# Patient Record
Sex: MALE | Race: WHITE | Employment: FULL TIME | URBAN - METROPOLITAN AREA
[De-identification: names, ages, dates, MRNs, and addresses within clinical notes are randomized per-mention and may not be internally consistent; named-entity substitution may affect disease eponyms.]

---

## 2017-05-25 NOTE — TELEPHONE ENCOUNTER
Pending Prescriptions Disp Refills    OMEPRAZOLE 40 MG Oral Capsule Delayed Release [Pharmacy Med Name: OMEPRAZOLE 40MG CAPSULES] 30 capsule 0     Sig: TAKE 1 CAPSULE BY MOUTH DAILY         Last seen 4/16/15 colonoscopy /EGD  LR 5/09/16    em

## 2017-05-26 RX ORDER — OMEPRAZOLE 40 MG/1
CAPSULE, DELAYED RELEASE ORAL
Qty: 30 CAPSULE | Refills: 3 | Status: SHIPPED | OUTPATIENT
Start: 2017-05-26 | End: 2017-09-27

## 2017-05-26 NOTE — TELEPHONE ENCOUNTER
RN to have the pt see me in the office, he has Barretts and needs monitoring.  Please make sure on recall for EGD

## 2017-05-30 NOTE — TELEPHONE ENCOUNTER
LVM for pt to call back. 1) Holding EGD (barretts) slot with PL for Thursday 6.15.17 at 9:30am. Pt unaware. Pt was due 4.20.17 for EGD (2 year recall). 2) Pt to be seen in office prior.  Pls try Friday 6.2 or 6.9    Pt informed that med refilled and to c

## 2017-06-02 NOTE — TELEPHONE ENCOUNTER
Pt accepted OV appt for Friday 6.9.17 at 1:45pm.  Per below, needed follow up for Barretts. HOLDING EGD slot for Friday 6.16.17 at 10:45am (arrival 9:45). This appt is to be confirmed after his OV with PL the week before.      Awaiting call back to add to

## 2017-06-09 ENCOUNTER — TELEPHONE (OUTPATIENT)
Dept: GASTROENTEROLOGY | Facility: CLINIC | Age: 47
End: 2017-06-09

## 2017-06-09 ENCOUNTER — OFFICE VISIT (OUTPATIENT)
Dept: GASTROENTEROLOGY | Facility: CLINIC | Age: 47
End: 2017-06-09

## 2017-06-09 VITALS
HEIGHT: 70 IN | DIASTOLIC BLOOD PRESSURE: 82 MMHG | HEART RATE: 69 BPM | BODY MASS INDEX: 40.8 KG/M2 | SYSTOLIC BLOOD PRESSURE: 132 MMHG | WEIGHT: 285 LBS

## 2017-06-09 DIAGNOSIS — K22.719 BARRETT'S ESOPHAGUS WITH DYSPLASIA: ICD-10-CM

## 2017-06-09 DIAGNOSIS — K21.9 GASTROESOPHAGEAL REFLUX DISEASE, ESOPHAGITIS PRESENCE NOT SPECIFIED: Primary | ICD-10-CM

## 2017-06-09 PROCEDURE — 99212 OFFICE O/P EST SF 10 MIN: CPT | Performed by: INTERNAL MEDICINE

## 2017-06-09 PROCEDURE — 99213 OFFICE O/P EST LOW 20 MIN: CPT | Performed by: INTERNAL MEDICINE

## 2017-06-09 NOTE — TELEPHONE ENCOUNTER
Scheduled for:  EGD 73923  Provider Name:  Dr. Claudia Francois  Date:  6/16/17  Location:  [facility]  Sedation:  MAC  Time:  1625 (pt is aware to arrive at 0945)   Prep:  NPO after midnight  Meds/Allergies Reconciled?:  Physician reviewed   Diagnosis with codes:  History

## 2017-06-09 NOTE — PATIENT INSTRUCTIONS
GERD  - continue on omeprazole daily  - ok to add in pepcid as needed  - weight loss - call [phone] for our weight program  - EGD for Barretts history

## 2017-06-09 NOTE — PROGRESS NOTES
Faustino Nicolas is a 55year old male. HPI:   Patient presents with: Follow - Up      The patient is a 78-year-old male who has a history of obesity and, chronic reflux disease.   The patient has gained about 35 pounds since he was last seen 2 years ag lesions  Neuro- appropriate response, alert, no confusion  . ASSESSMENT/PLAN:   Assessment  Crain's esophagus  GERD    The patient is a 63-year-old male who has a history of chronic reflux. I discussed the options with the patient.   He has history of B

## 2017-06-16 ENCOUNTER — SURGERY (OUTPATIENT)
Age: 47
End: 2017-06-16

## 2017-06-16 ENCOUNTER — ANESTHESIA EVENT (OUTPATIENT)
Dept: ENDOSCOPY | Facility: HOSPITAL | Age: 47
End: 2017-06-16
Payer: COMMERCIAL

## 2017-06-16 ENCOUNTER — ANESTHESIA (OUTPATIENT)
Dept: ENDOSCOPY | Facility: HOSPITAL | Age: 47
End: 2017-06-16
Payer: COMMERCIAL

## 2017-06-16 ENCOUNTER — HOSPITAL ENCOUNTER (OUTPATIENT)
Facility: HOSPITAL | Age: 47
Setting detail: HOSPITAL OUTPATIENT SURGERY
Discharge: HOME | End: 2017-06-16
Attending: INTERNAL MEDICINE | Admitting: INTERNAL MEDICINE
Payer: COMMERCIAL

## 2017-06-16 VITALS
BODY MASS INDEX: 38.65 KG/M2 | HEIGHT: 70 IN | WEIGHT: 270 LBS | OXYGEN SATURATION: 96 % | DIASTOLIC BLOOD PRESSURE: 56 MMHG | SYSTOLIC BLOOD PRESSURE: 116 MMHG | HEART RATE: 57 BPM | RESPIRATION RATE: 20 BRPM

## 2017-06-16 DIAGNOSIS — K22.70 BARRETT ESOPHAGUS: Primary | ICD-10-CM

## 2017-06-16 PROCEDURE — 43239 EGD BIOPSY SINGLE/MULTIPLE: CPT | Performed by: INTERNAL MEDICINE

## 2017-06-16 PROCEDURE — 0DB38ZX EXCISION OF LOWER ESOPHAGUS, VIA NATURAL OR ARTIFICIAL OPENING ENDOSCOPIC, DIAGNOSTIC: ICD-10-PCS | Performed by: INTERNAL MEDICINE

## 2017-06-16 RX ORDER — SODIUM CHLORIDE, SODIUM LACTATE, POTASSIUM CHLORIDE, CALCIUM CHLORIDE 600; 310; 30; 20 MG/100ML; MG/100ML; MG/100ML; MG/100ML
INJECTION, SOLUTION INTRAVENOUS CONTINUOUS PRN
Status: DISCONTINUED | OUTPATIENT
Start: 2017-06-16 | End: 2017-06-16 | Stop reason: SURG

## 2017-06-16 RX ORDER — SODIUM CHLORIDE, SODIUM LACTATE, POTASSIUM CHLORIDE, CALCIUM CHLORIDE 600; 310; 30; 20 MG/100ML; MG/100ML; MG/100ML; MG/100ML
INJECTION, SOLUTION INTRAVENOUS CONTINUOUS
Status: DISCONTINUED | OUTPATIENT
Start: 2017-06-16 | End: 2017-06-16

## 2017-06-16 RX ORDER — NALOXONE HYDROCHLORIDE 0.4 MG/ML
80 INJECTION, SOLUTION INTRAMUSCULAR; INTRAVENOUS; SUBCUTANEOUS AS NEEDED
Status: DISCONTINUED | OUTPATIENT
Start: 2017-06-16 | End: 2017-06-16

## 2017-06-16 RX ORDER — LIDOCAINE HYDROCHLORIDE 10 MG/ML
INJECTION, SOLUTION EPIDURAL; INFILTRATION; INTRACAUDAL; PERINEURAL AS NEEDED
Status: DISCONTINUED | OUTPATIENT
Start: 2017-06-16 | End: 2017-06-16 | Stop reason: SURG

## 2017-06-16 RX ADMIN — SODIUM CHLORIDE, SODIUM LACTATE, POTASSIUM CHLORIDE, CALCIUM CHLORIDE: 600; 310; 30; 20 INJECTION, SOLUTION INTRAVENOUS at 11:20:00

## 2017-06-16 RX ADMIN — LIDOCAINE HYDROCHLORIDE 50 MG: 10 INJECTION, SOLUTION EPIDURAL; INFILTRATION; INTRACAUDAL; PERINEURAL at 11:11:00

## 2017-06-16 RX ADMIN — SODIUM CHLORIDE, SODIUM LACTATE, POTASSIUM CHLORIDE, CALCIUM CHLORIDE: 600; 310; 30; 20 INJECTION, SOLUTION INTRAVENOUS at 11:07:00

## 2017-06-16 NOTE — ANESTHESIA PREPROCEDURE EVALUATION
Anesthesia PreOp Note    HPI:     Rodolfo Perdomo is a 55year old male who presents for preoperative consultation requested by: Drake Siemens, MD    Date of Surgery: 6/16/2017    Procedure(s):  ESOPHAGOGASTRODUODENOSCOPY (EGD)  Indication: History of 129/70 and his pulse is 55.  His respiration is 18 and oxygen saturation is 96%.    06/16/17  1008   BP: 129/70   Pulse: 55   Resp: 18   Height: 5' 10\"   Weight: 270 lb   SpO2: 96%        Anesthesia ROS/Med Hx and Physical Exam     Nursing notes reviewed

## 2017-06-16 NOTE — H&P
History & Physical Examination    Patient Name: Kyara Hernández  MRN: [id number]  [facility]: [id number]  YOB: 1970    Diagnosis: Crain's esophagus        Prescriptions prior to admission:  OMEPRAZOLE 40 MG Oral Capsule Delayed Release TAKE 1 CA

## 2017-06-16 NOTE — ANESTHESIA POSTPROCEDURE EVALUATION
Patient: Judge Poe    Procedure Summary     Date Anesthesia Start Anesthesia Stop Room / Location    06/16/17 1110  [facility] ENDOSCOPY 05 / [facility] ENDOSCOPY       Procedure Diagnosis Surgeon Responsible Provider    ESOPHAGOGASTRODUODENOSCOPY (EGD) (N/A ) Hi

## 2017-06-16 NOTE — OPERATIVE REPORT
[facility] HOSP - [facility] Endoscopy Report      Preoperative Diagnosis:  - History of Crain's esophagus      Postoperative Diagnosis:  - Biopsies taken distal esophagus      Procedure:      Esophagogastroduodenoscopy       Surgeon:  Denisse Martinez M.D.

## 2017-06-28 ENCOUNTER — TELEPHONE (OUTPATIENT)
Dept: GASTROENTEROLOGY | Facility: CLINIC | Age: 47
End: 2017-06-28

## 2017-06-28 NOTE — TELEPHONE ENCOUNTER
----- Message from Frank Mccarthy MD sent at 6/23/2017  6:50 PM CDT -----  No evidence of intestinal metaplasia.       Place on call back for 3 years for EGD

## 2017-06-28 NOTE — TELEPHONE ENCOUNTER
Place on call back for 3 years for EGD   Recall EGD in 3 years per .  EGD done 6/16/17  Entered into 3 yr EGD recall    em

## 2017-09-28 NOTE — TELEPHONE ENCOUNTER
See refill request  Patient last seen 6-9-17.    Medication last refilled 5-26-17    Pending Prescriptions Disp Refills    OMEPRAZOLE 40 MG Oral Capsule Delayed Release [Pharmacy Med Name: OMEPRAZOLE 40MG CAPSULES] 30 capsule 0     Sig: TAKE 1 CAPSULE BY MO

## 2017-09-29 ENCOUNTER — OFFICE VISIT (OUTPATIENT)
Dept: INTERNAL MEDICINE CLINIC | Facility: CLINIC | Age: 47
End: 2017-09-29

## 2017-09-29 ENCOUNTER — LAB ENCOUNTER (OUTPATIENT)
Dept: LAB | Facility: HOSPITAL | Age: 47
End: 2017-09-29
Attending: INTERNAL MEDICINE
Payer: COMMERCIAL

## 2017-09-29 VITALS
WEIGHT: 269 LBS | DIASTOLIC BLOOD PRESSURE: 84 MMHG | HEART RATE: 57 BPM | HEIGHT: 70 IN | BODY MASS INDEX: 38.51 KG/M2 | SYSTOLIC BLOOD PRESSURE: 128 MMHG

## 2017-09-29 DIAGNOSIS — R00.2 PALPITATIONS: ICD-10-CM

## 2017-09-29 DIAGNOSIS — R00.2 PALPITATIONS: Primary | ICD-10-CM

## 2017-09-29 DIAGNOSIS — Z00.00 ANNUAL PHYSICAL EXAM: Primary | ICD-10-CM

## 2017-09-29 DIAGNOSIS — Z00.00 ANNUAL PHYSICAL EXAM: ICD-10-CM

## 2017-09-29 PROBLEM — E78.00 HYPERCHOLESTEROLEMIA: Status: ACTIVE | Noted: 2017-09-29

## 2017-09-29 PROCEDURE — 93010 ELECTROCARDIOGRAM REPORT: CPT | Performed by: INTERNAL MEDICINE

## 2017-09-29 PROCEDURE — 99396 PREV VISIT EST AGE 40-64: CPT | Performed by: INTERNAL MEDICINE

## 2017-09-29 PROCEDURE — 85027 COMPLETE CBC AUTOMATED: CPT

## 2017-09-29 PROCEDURE — 36415 COLL VENOUS BLD VENIPUNCTURE: CPT

## 2017-09-29 PROCEDURE — 80053 COMPREHEN METABOLIC PANEL: CPT

## 2017-09-29 PROCEDURE — 80061 LIPID PANEL: CPT

## 2017-09-29 PROCEDURE — 93005 ELECTROCARDIOGRAM TRACING: CPT

## 2017-09-29 NOTE — H&P
Reji Carpenter is a 55year old male who presents for a complete physical exam.   HPI:   Previous PCP was Dr. Moiz Hickman, now retired. He has a number of symptoms to discuss as delineated below under review of systems. He works as a .    and Grandfather       MI age 47   • Heart Disease Paternal Grandmother       MI age 61   • Brain Cancer [OTHER] Paternal Grandfather       Social History:  Smoking status: Former Smoker                                                              Packs complete physical exam.     1. Annual physical exam  Check CMP CBC and lipid profile today. Order sent.   Discussed and recommended healthy lifestyle–ongoing abstinence from tobacco, alcohol in moderation not to exceed 2 beers daily, healthy diet, regular

## 2017-09-29 NOTE — PATIENT INSTRUCTIONS
Await results of blood tests, EKG and Holter monitor. Please try to eat healthy, exercise regularly and lose weight. Abstain from all tobacco use. Please limit alcohol to no more than 2 beers daily.   Follow-up visit in about 2 weeks to reassess your sym

## 2017-10-02 RX ORDER — OMEPRAZOLE 40 MG/1
CAPSULE, DELAYED RELEASE ORAL
Qty: 90 CAPSULE | Refills: 3 | Status: SHIPPED | OUTPATIENT
Start: 2017-10-02 | End: 2018-09-28

## 2017-10-02 NOTE — TELEPHONE ENCOUNTER
Per Dr. Abbie Pierre last office note in June 2017 patient to continue on omeprazole daily due to history of Crain's and risks of progression to esophageal cancer. Patient is up-to-date on EGD with a 3 year recall. Rx refilled for 1 year.

## 2018-02-04 ENCOUNTER — HOSPITAL ENCOUNTER (OUTPATIENT)
Facility: HOSPITAL | Age: 48
Setting detail: OBSERVATION
Discharge: HOME | End: 2018-02-05
Attending: EMERGENCY MEDICINE | Admitting: HOSPITALIST
Payer: COMMERCIAL

## 2018-02-04 ENCOUNTER — APPOINTMENT (OUTPATIENT)
Dept: GENERAL RADIOLOGY | Facility: HOSPITAL | Age: 48
End: 2018-02-04
Payer: COMMERCIAL

## 2018-02-04 ENCOUNTER — HOSPITAL ENCOUNTER (OUTPATIENT)
Age: 48
Discharge: ANOTHER HOSPITAL | End: 2018-02-04
Attending: FAMILY MEDICINE
Payer: COMMERCIAL

## 2018-02-04 VITALS
SYSTOLIC BLOOD PRESSURE: 158 MMHG | WEIGHT: 265 LBS | RESPIRATION RATE: 20 BRPM | DIASTOLIC BLOOD PRESSURE: 100 MMHG | OXYGEN SATURATION: 97 % | BODY MASS INDEX: 37.94 KG/M2 | HEIGHT: 70 IN | TEMPERATURE: 97 F | HEART RATE: 68 BPM

## 2018-02-04 DIAGNOSIS — R07.9 CHEST PAIN RADIATING TO JAW: Primary | ICD-10-CM

## 2018-02-04 DIAGNOSIS — I20.0 UNSTABLE ANGINA (HCC): Primary | ICD-10-CM

## 2018-02-04 LAB
ANION GAP SERPL CALC-SCNC: 10 MMOL/L (ref 0–18)
BASOPHILS # BLD: 0.1 K/UL (ref 0–0.2)
BASOPHILS NFR BLD: 1 %
BUN SERPL-MCNC: 14 MG/DL (ref 8–20)
BUN/CREAT SERPL: 12.4 (ref 10–20)
CALCIUM SERPL-MCNC: 9 MG/DL (ref 8.5–10.5)
CHLORIDE SERPL-SCNC: 103 MMOL/L (ref 95–110)
CO2 SERPL-SCNC: 23 MMOL/L (ref 22–32)
CREAT SERPL-MCNC: 1.13 MG/DL (ref 0.5–1.5)
D DIMER PPP FEU-MCNC: 0.44 MCG/ML (ref ?–0.5)
EOSINOPHIL # BLD: 0.3 K/UL (ref 0–0.7)
EOSINOPHIL NFR BLD: 3 %
ERYTHROCYTE [DISTWIDTH] IN BLOOD BY AUTOMATED COUNT: 13.2 % (ref 11–15)
GLUCOSE SERPL-MCNC: 120 MG/DL (ref 70–99)
HCT VFR BLD AUTO: 42.9 % (ref 41–52)
HGB BLD-MCNC: 14.5 G/DL (ref 13.5–17.5)
LYMPHOCYTES # BLD: 1.5 K/UL (ref 1–4)
LYMPHOCYTES NFR BLD: 19 %
MCH RBC QN AUTO: 28.2 PG (ref 27–32)
MCHC RBC AUTO-ENTMCNC: 33.7 G/DL (ref 32–37)
MCV RBC AUTO: 83.7 FL (ref 80–100)
MONOCYTES # BLD: 0.5 K/UL (ref 0–1)
MONOCYTES NFR BLD: 6 %
NEUTROPHILS # BLD AUTO: 5.7 K/UL (ref 1.8–7.7)
NEUTROPHILS NFR BLD: 71 %
OSMOLALITY UR CALC.SUM OF ELEC: 284 MOSM/KG (ref 275–295)
PLATELET # BLD AUTO: 170 K/UL (ref 140–400)
PMV BLD AUTO: 8.2 FL (ref 7.4–10.3)
POTASSIUM SERPL-SCNC: 4 MMOL/L (ref 3.3–5.1)
RBC # BLD AUTO: 5.13 M/UL (ref 4.5–5.9)
SODIUM SERPL-SCNC: 136 MMOL/L (ref 136–144)
TROPONIN I SERPL-MCNC: 0 NG/ML (ref ?–0.03)
WBC # BLD AUTO: 8 K/UL (ref 4–11)

## 2018-02-04 PROCEDURE — 99220 INITIAL OBSERVATION CARE,LEVL III: CPT | Performed by: HOSPITALIST

## 2018-02-04 PROCEDURE — 93005 ELECTROCARDIOGRAM TRACING: CPT

## 2018-02-04 PROCEDURE — 99214 OFFICE O/P EST MOD 30 MIN: CPT

## 2018-02-04 PROCEDURE — 93010 ELECTROCARDIOGRAM REPORT: CPT | Performed by: FAMILY MEDICINE

## 2018-02-04 PROCEDURE — 93010 ELECTROCARDIOGRAM REPORT: CPT

## 2018-02-04 PROCEDURE — 71046 X-RAY EXAM CHEST 2 VIEWS: CPT | Performed by: EMERGENCY MEDICINE

## 2018-02-04 RX ORDER — ACETAMINOPHEN 325 MG/1
650 TABLET ORAL EVERY 6 HOURS PRN
Status: DISCONTINUED | OUTPATIENT
Start: 2018-02-04 | End: 2018-02-05

## 2018-02-04 RX ORDER — POLYETHYLENE GLYCOL 3350 17 G/17G
17 POWDER, FOR SOLUTION ORAL DAILY PRN
Status: DISCONTINUED | OUTPATIENT
Start: 2018-02-04 | End: 2018-02-05

## 2018-02-04 RX ORDER — MORPHINE SULFATE 2 MG/ML
1 INJECTION, SOLUTION INTRAMUSCULAR; INTRAVENOUS EVERY 2 HOUR PRN
Status: DISCONTINUED | OUTPATIENT
Start: 2018-02-04 | End: 2018-02-05

## 2018-02-04 RX ORDER — PANTOPRAZOLE SODIUM 40 MG/1
40 TABLET, DELAYED RELEASE ORAL
Status: DISCONTINUED | OUTPATIENT
Start: 2018-02-05 | End: 2018-02-05

## 2018-02-04 RX ORDER — MORPHINE SULFATE 2 MG/ML
2 INJECTION, SOLUTION INTRAMUSCULAR; INTRAVENOUS EVERY 2 HOUR PRN
Status: DISCONTINUED | OUTPATIENT
Start: 2018-02-04 | End: 2018-02-05

## 2018-02-04 RX ORDER — DOCUSATE SODIUM 100 MG/1
100 CAPSULE, LIQUID FILLED ORAL 2 TIMES DAILY
Status: DISCONTINUED | OUTPATIENT
Start: 2018-02-04 | End: 2018-02-05

## 2018-02-04 RX ORDER — ONDANSETRON 2 MG/ML
4 INJECTION INTRAMUSCULAR; INTRAVENOUS EVERY 6 HOURS PRN
Status: DISCONTINUED | OUTPATIENT
Start: 2018-02-04 | End: 2018-02-05

## 2018-02-04 RX ORDER — BISACODYL 10 MG
10 SUPPOSITORY, RECTAL RECTAL
Status: DISCONTINUED | OUTPATIENT
Start: 2018-02-04 | End: 2018-02-05

## 2018-02-04 RX ORDER — 0.9 % SODIUM CHLORIDE 0.9 %
1000 INTRAVENOUS SOLUTION INTRAVENOUS ONCE
Status: COMPLETED | OUTPATIENT
Start: 2018-02-04 | End: 2018-02-04

## 2018-02-04 RX ORDER — SODIUM PHOSPHATE, DIBASIC AND SODIUM PHOSPHATE, MONOBASIC 7; 19 G/133ML; G/133ML
1 ENEMA RECTAL ONCE AS NEEDED
Status: DISCONTINUED | OUTPATIENT
Start: 2018-02-04 | End: 2018-02-05

## 2018-02-04 RX ORDER — 0.9 % SODIUM CHLORIDE 0.9 %
VIAL (ML) INJECTION
Status: COMPLETED
Start: 2018-02-04 | End: 2018-02-04

## 2018-02-04 RX ORDER — SODIUM CHLORIDE 0.9 % (FLUSH) 0.9 %
3 SYRINGE (ML) INJECTION AS NEEDED
Status: DISCONTINUED | OUTPATIENT
Start: 2018-02-04 | End: 2018-02-05

## 2018-02-04 RX ORDER — MORPHINE SULFATE 4 MG/ML
4 INJECTION, SOLUTION INTRAMUSCULAR; INTRAVENOUS EVERY 2 HOUR PRN
Status: DISCONTINUED | OUTPATIENT
Start: 2018-02-04 | End: 2018-02-05

## 2018-02-04 RX ORDER — NITROGLYCERIN 0.4 MG/1
0.4 TABLET SUBLINGUAL EVERY 5 MIN PRN
Status: DISCONTINUED | OUTPATIENT
Start: 2018-02-04 | End: 2018-02-05

## 2018-02-04 NOTE — PLAN OF CARE
CARDIOVASCULAR - ADULT    • Maintains optimal cardiac output and hemodynamic stability Progressing        Patient Centered Care    • Patient preferences are identified and integrated in the patient's plan of care Progressing        Patient/Family Goals

## 2018-02-04 NOTE — ED PROVIDER NOTES
Patient Seen in: [facility] [place] History   CC:  Patient presents with:  Chest Pain Angina (cardiovascular)    Stated Complaint: CHEST PAIN SOB    ------------------------------  Per Rn:      PATIENT ARRIVED AMBULATORY TO  Juan Every ) 97.2 °F (36.2 °C) (Oral)   Resp 20   Ht 177.8 cm (5' 10\")   Wt 120.2 kg   SpO2 97%   BMI 38.02 kg/m²         General Appearance:    Alert, cooperative, no distress, appears stated age; nad   Head:    Normocephalic, without obvious abnormality, atrauma

## 2018-02-04 NOTE — H&P
[facility] [place] [facility]  History & Physical     Brown Awkward  : 1970    Status: Emergency  Day #: 0    Attending: Sarah Ackerman MD  PCP: Domonique Ghosh MD     Date of Encounter:  2018  Date of Admission:  2018     Chief Complaint: tobacco: Never Used                      Alcohol use:  Yes              Comment: 6 beers daily      Allergies: No Known Allergies    Medications :   OMEPRAZOLE 40 MG Oral Capsule Delayed Release,  TAKE 1 CAPSULE BY MOUTH DAILY     Review of Systems   Pertin Assessment and Plan     Atypical Chest Pain  - sounds more musculoskeletal as reproducible worse with coughing or could be coming from C-Spine as he's having numbness and tingling with back and chest pain.    - Due to risk factors for CAD will obtain Stress

## 2018-02-04 NOTE — ED NOTES
911 AT [address] [place] [address] TO ED. REPORT GIVEN TO JAVIER STAUFFER. IV LINE IN PLACE. 0.9NS TKO. PATIENT STATES \"I TOOK 3 [employer] BEFORE I CAME.  THEY WERE THE FULL DOSE NOT THE BABIES\"

## 2018-02-04 NOTE — ED INITIAL ASSESSMENT (HPI)
C/o chest discomfort ongoing x2 weeks, worse today with dizziness, sob. States that he has been having intermittent cp for a while, saw pcp 2 wks ago for complaint wanting stress test.  States today he felt worse and pain radiates to back.

## 2018-02-04 NOTE — ED INITIAL ASSESSMENT (HPI)
PATIENT ARRIVED AMBULATORY TO ROOM C/O LEFT SIDED CHEST PAIN INTERMITTENT X2 WEEKS WORSENED TODAY 1 HOUR PTA. +SOB. NO N/V/D. EASY NON LABORED RESPIRATIONS.

## 2018-02-04 NOTE — ED PROVIDER NOTES
Patient Seen in: [facility] AND [facility] Emergency Department    History   Patient presents with:  Chest Pain Angina (cardiovascular)    Stated Complaint: CP     HPI    52year old male with past medical history of GERD, high cholesterol, previous smoker who i [02/04/18 1241]  Resp: 14 [02/04/18 1241]  Temp: 97.8 °F (36.6 °C) [02/04/18 1628]  Temp src: Oral [02/04/18 1628]  SpO2: 98 % [02/04/18 1241]  O2 Device: Nasal cannula [02/04/18 1241]    Current:/92 (BP Location: Right arm)   Pulse 59   Temp 97.8 °F PLATELET.   Procedure                               Abnormality         Status                     ---------                               -----------         ------                     CBC W/ DIFFERENTIAL[[id number]]                              Final resul

## 2018-02-05 ENCOUNTER — APPOINTMENT (OUTPATIENT)
Dept: NUCLEAR MEDICINE | Facility: HOSPITAL | Age: 48
End: 2018-02-05
Attending: HOSPITALIST
Payer: COMMERCIAL

## 2018-02-05 ENCOUNTER — APPOINTMENT (OUTPATIENT)
Dept: CV DIAGNOSTICS | Facility: HOSPITAL | Age: 48
End: 2018-02-05
Attending: HOSPITALIST
Payer: COMMERCIAL

## 2018-02-05 VITALS
WEIGHT: 272 LBS | SYSTOLIC BLOOD PRESSURE: 121 MMHG | TEMPERATURE: 98 F | OXYGEN SATURATION: 97 % | RESPIRATION RATE: 18 BRPM | HEART RATE: 51 BPM | BODY MASS INDEX: 39 KG/M2 | DIASTOLIC BLOOD PRESSURE: 76 MMHG

## 2018-02-05 LAB
ANION GAP SERPL CALC-SCNC: 7 MMOL/L (ref 0–18)
BUN SERPL-MCNC: 12 MG/DL (ref 8–20)
BUN/CREAT SERPL: 10.9 (ref 10–20)
CALCIUM SERPL-MCNC: 9.3 MG/DL (ref 8.5–10.5)
CHLORIDE SERPL-SCNC: 105 MMOL/L (ref 95–110)
CHOLEST SERPL-MCNC: 161 MG/DL (ref 110–200)
CO2 SERPL-SCNC: 27 MMOL/L (ref 22–32)
CREAT SERPL-MCNC: 1.1 MG/DL (ref 0.5–1.5)
ERYTHROCYTE [DISTWIDTH] IN BLOOD BY AUTOMATED COUNT: 13.2 % (ref 11–15)
GLUCOSE SERPL-MCNC: 106 MG/DL (ref 70–99)
HBA1C MFR BLD: 5.2 % (ref 4–6)
HCT VFR BLD AUTO: 44.4 % (ref 41–52)
HDLC SERPL-MCNC: 31 MG/DL
HGB BLD-MCNC: 15 G/DL (ref 13.5–17.5)
LDLC SERPL CALC-MCNC: 103 MG/DL (ref 0–99)
MCH RBC QN AUTO: 28.3 PG (ref 27–32)
MCHC RBC AUTO-ENTMCNC: 33.9 G/DL (ref 32–37)
MCV RBC AUTO: 83.5 FL (ref 80–100)
NONHDLC SERPL-MCNC: 130 MG/DL
OSMOLALITY UR CALC.SUM OF ELEC: 288 MOSM/KG (ref 275–295)
PLATELET # BLD AUTO: 170 K/UL (ref 140–400)
PMV BLD AUTO: 8.2 FL (ref 7.4–10.3)
POTASSIUM SERPL-SCNC: 3.9 MMOL/L (ref 3.3–5.1)
RBC # BLD AUTO: 5.32 M/UL (ref 4.5–5.9)
SODIUM SERPL-SCNC: 139 MMOL/L (ref 136–144)
TRIGL SERPL-MCNC: 136 MG/DL (ref 1–149)
TSH SERPL-ACNC: 1.04 UIU/ML (ref 0.45–5.33)
WBC # BLD AUTO: 6.5 K/UL (ref 4–11)

## 2018-02-05 PROCEDURE — 93017 CV STRESS TEST TRACING ONLY: CPT | Performed by: HOSPITALIST

## 2018-02-05 PROCEDURE — 99217 OBSERVATION CARE DISCHARGE: CPT | Performed by: HOSPITALIST

## 2018-02-05 PROCEDURE — 78452 HT MUSCLE IMAGE SPECT MULT: CPT | Performed by: HOSPITALIST

## 2018-02-05 RX ORDER — ACETAMINOPHEN 325 MG/1
650 TABLET ORAL EVERY 6 HOURS PRN
Refills: 0 | Status: SHIPPED | COMMUNITY
Start: 2018-02-05

## 2018-02-05 RX ORDER — 0.9 % SODIUM CHLORIDE 0.9 %
VIAL (ML) INJECTION
Status: COMPLETED
Start: 2018-02-05 | End: 2018-02-05

## 2018-02-05 NOTE — PAYOR COMM NOTE
--------------  ADMISSION REVIEW     Payor: [employer]  Subscriber #:  [id number]  Authorization Number: N/A    Admit date: N/A  Admit time: N/A       Admitting Physician: Darline Mauricio MD  Attending Physician:  Darline Mauricio MD  [facility] Exam[VR.1]   ED Triage Vitals  BP: 131/88 [02/04/18 1241]  Pulse: 53 [02/04/18 1241]  Resp: 14 [02/04/18 1241]  Temp: 97.8 °F (36.6 °C) [02/04/18 1628]  Temp src: Oral [02/04/18 1628]  SpO2: 98 % [02/04/18 1241]  O2 Device: Nasal cannula [02/04/18 1241][VR serial troponin and EKG.    - NTG PRN CP, full dose ASA  -     MEDICATIONS ADMINISTERED IN LAST 1 DAY:  Pantoprazole Sodium (PROTONIX) EC tab 40 mg     Date Action Dose Route User    2/5/2018 0558 Given 40 mg Oral EH Samaniego

## 2018-02-05 NOTE — PAYOR COMM NOTE
OBSV STATUS    --------------  ADMISSION REVIEW     Payor: [employer] [facility] PPO  Subscriber #:  [id number]  Authorization Number: N/A    Admit date: N/A  Admit time: N/A       Admitting Physician: Earline Mari MD  Attending Physician:  Riley Tello Past Surgical History:  1998: CARPAL TUNNEL RELEASE Bilateral  2015: ELBOW SURGERY Right      Comment: Ulnar nerve  1990: OTHER Right      Comment: Shoulder surgery  Age 7: TONSILLECTOMY        Smoking status: Former Smoker There is no guarding. Musculoskeletal: Normal range of motion. He exhibits no edema or tenderness. Neurological: He is alert and oriented to person, place, and time. He has normal strength. No sensory deficit.  Coordination normal.   Skin: Skin is warm change[VR.2]           Radiology exams  Viewed and reviewed by myself and findings discussed with patient including need for follow up[VR.1]    Admission disposition: 2/4/2018  3:19 PM         Pt with cp rad into LUE and L jaw still somewhat in L jaw, + pa having chest discomfort for several years which got worse today. The patient c/o L lateral chest/rib cage pain sharp in intensity 5/10 which occurred this morning when he tried to reach for something overhead.  Assoc with R jaw locking up and L facial numbn HEENT: NCAT, neck supple, no carotid bruit. CV: RRR, S1S2, and intact distal pulses. No gallop, rub, murmur. Chest pain reproducible. Pulm: Effort and breath sounds normal. No distress, wheezes, rales, rhonchi.   Abd: Soft, NTND, BS normal, no mass, n diet  - rpt in 3 months outpt.      Obesity stage II BMI 38  - counseled on diet exercise and weight loss    50 minutes spent on this admission - examining patient, obtaining history, reviewing previous medical records, going over test results/imaging and d

## 2018-02-05 NOTE — DISCHARGE SUMMARY
[facility] - [facility]    Discharge Summary    Rodolfo Perdomo Patient Status:  Observation    1970 MRN [id number]   Location [facility] 3W/SW Attending No att. providers found   Hosp Day # 0 PCP Felix Lopes MD     Date of Admissi 02/04/18. IMAGING STUDIES: SOME MAY NEED FOLLOW UP WITH PCP   Xr Chest Pa + Lat Chest (cpt=71046)    Result Date: 2/4/2018  CONCLUSION:  1. No acute cardiopulmonary disease.  No significant change         Impression     CONCLUSION: Normal ellie Galvez

## 2018-03-17 ENCOUNTER — HOSPITAL ENCOUNTER (OUTPATIENT)
Age: 48
Discharge: HOME | End: 2018-03-17
Attending: EMERGENCY MEDICINE
Payer: COMMERCIAL

## 2018-03-17 VITALS
WEIGHT: 260 LBS | HEART RATE: 57 BPM | HEIGHT: 70 IN | OXYGEN SATURATION: 97 % | SYSTOLIC BLOOD PRESSURE: 153 MMHG | RESPIRATION RATE: 16 BRPM | TEMPERATURE: 98 F | DIASTOLIC BLOOD PRESSURE: 61 MMHG | BODY MASS INDEX: 37.22 KG/M2

## 2018-03-17 DIAGNOSIS — J02.0 STREP PHARYNGITIS: Primary | ICD-10-CM

## 2018-03-17 LAB — S PYO AG THROAT QL: POSITIVE

## 2018-03-17 PROCEDURE — 99213 OFFICE O/P EST LOW 20 MIN: CPT

## 2018-03-17 PROCEDURE — 87430 STREP A AG IA: CPT

## 2018-03-17 PROCEDURE — 99214 OFFICE O/P EST MOD 30 MIN: CPT

## 2018-03-17 RX ORDER — CEFDINIR 300 MG/1
300 CAPSULE ORAL 2 TIMES DAILY
Qty: 20 CAPSULE | Refills: 0 | Status: SHIPPED | OUTPATIENT
Start: 2018-03-17 | End: 2018-03-27

## 2018-03-17 RX ORDER — ALBUTEROL SULFATE 90 UG/1
1 AEROSOL, METERED RESPIRATORY (INHALATION) EVERY 4 HOURS PRN
Qty: 1 INHALER | Refills: 0 | Status: SHIPPED | OUTPATIENT
Start: 2018-03-17 | End: 2018-04-16

## 2018-03-17 NOTE — ED PROVIDER NOTES
Patient Seen in: [facility]    History   Patient presents with:  Sore Throat    Stated Complaint: sore throat    HPI    Patient complains of sore throat for 1 week and cough for 2 weeks.   He states he has had a burning se 10\")   Wt 117.9 kg   SpO2 97%   BMI 37.31 kg/m²         Physical Exam    The patient is awake and alert nontoxic in appearance  Eyes pupils are equal and reactive sclera nonicteric  ENT on exam of the mouth there is mild pharyngeal erythema without exudat

## 2018-03-17 NOTE — ED INITIAL ASSESSMENT (HPI)
REPORTS SORE THROAT ACCOMPANIED BY NASAL CONGESTION, COUGH AND SINUS PRESSURE X 2 WEEKS. STATES COUGH IS PRODUCTIVE WITH GREEN/YELLOW SPUTUM. DENIES FEVERS.

## 2018-08-28 ENCOUNTER — TELEPHONE (OUTPATIENT)
Dept: GASTROENTEROLOGY | Facility: CLINIC | Age: 48
End: 2018-08-28

## 2018-08-28 ENCOUNTER — OFFICE VISIT (OUTPATIENT)
Dept: GASTROENTEROLOGY | Facility: CLINIC | Age: 48
End: 2018-08-28
Payer: COMMERCIAL

## 2018-08-28 VITALS
WEIGHT: 269 LBS | DIASTOLIC BLOOD PRESSURE: 84 MMHG | SYSTOLIC BLOOD PRESSURE: 138 MMHG | HEIGHT: 69 IN | BODY MASS INDEX: 39.84 KG/M2 | HEART RATE: 60 BPM

## 2018-08-28 DIAGNOSIS — R13.10 DYSPHAGIA, UNSPECIFIED TYPE: ICD-10-CM

## 2018-08-28 DIAGNOSIS — K21.9 GASTROESOPHAGEAL REFLUX DISEASE, ESOPHAGITIS PRESENCE NOT SPECIFIED: Primary | ICD-10-CM

## 2018-08-28 PROCEDURE — 99213 OFFICE O/P EST LOW 20 MIN: CPT | Performed by: INTERNAL MEDICINE

## 2018-08-28 NOTE — TELEPHONE ENCOUNTER
Scheduled for:  EGD   Provider Name: DAVID  Date:  10-4-18  Location:  [facility]   Sedation:  MAC  Time:  11:30am, arrival 10:30am   Prep: NPO after midnight   Meds/Allergies Reconciled?:  yes  Diagnosis with codes:  GERD K21.9  Was patient informed to call [facility]

## 2018-08-28 NOTE — PATIENT INSTRUCTIONS
Neck pain/GERD  - EGD with MAC   - cut down alcohol  - continue on omeprazole  - discussed possible ENT input /neck scan if EGD negative

## 2018-09-04 NOTE — PROGRESS NOTES
Harl Cowden is a 52year old male.     HPI:   Patient presents with:  Globus: lump in throat in the morning and some difficulty swallowing     The patient is a 49-year-old male who has a history of chronic reflux, Crain's esophagus who presents with dermatologic symptoms. PHYSICAL EXAM:   Blood pressure 138/84, pulse 60, height 5' 9\" (1.753 m), weight 269 lb (122 kg).     The patient appears their stated age and is in no acute distress  HEENT- anicteric sclera, neck no lymphadnopathy, OP- clear wi

## 2018-09-28 RX ORDER — OMEPRAZOLE 40 MG/1
CAPSULE, DELAYED RELEASE ORAL
Qty: 90 CAPSULE | Refills: 0 | Status: SHIPPED | OUTPATIENT
Start: 2018-09-28 | End: 2018-12-30

## 2018-09-28 NOTE — TELEPHONE ENCOUNTER
Please advise on refill request below.     LOV 08/28/18 with Dr. Jordyn Servin 10/02/17 #90 with 3 refills

## 2018-10-04 ENCOUNTER — ANESTHESIA (OUTPATIENT)
Dept: ENDOSCOPY | Facility: HOSPITAL | Age: 48
End: 2018-10-04
Payer: COMMERCIAL

## 2018-10-04 ENCOUNTER — HOSPITAL ENCOUNTER (OUTPATIENT)
Facility: HOSPITAL | Age: 48
Setting detail: HOSPITAL OUTPATIENT SURGERY
Discharge: HOME | End: 2018-10-04
Attending: INTERNAL MEDICINE | Admitting: INTERNAL MEDICINE
Payer: COMMERCIAL

## 2018-10-04 ENCOUNTER — ANESTHESIA EVENT (OUTPATIENT)
Dept: ENDOSCOPY | Facility: HOSPITAL | Age: 48
End: 2018-10-04
Payer: COMMERCIAL

## 2018-10-04 DIAGNOSIS — K21.9 GASTROESOPHAGEAL REFLUX DISEASE, ESOPHAGITIS PRESENCE NOT SPECIFIED: ICD-10-CM

## 2018-10-04 PROCEDURE — 0DB38ZX EXCISION OF LOWER ESOPHAGUS, VIA NATURAL OR ARTIFICIAL OPENING ENDOSCOPIC, DIAGNOSTIC: ICD-10-PCS | Performed by: INTERNAL MEDICINE

## 2018-10-04 PROCEDURE — 43239 EGD BIOPSY SINGLE/MULTIPLE: CPT | Performed by: INTERNAL MEDICINE

## 2018-10-04 PROCEDURE — 0DB28ZX EXCISION OF MIDDLE ESOPHAGUS, VIA NATURAL OR ARTIFICIAL OPENING ENDOSCOPIC, DIAGNOSTIC: ICD-10-PCS | Performed by: INTERNAL MEDICINE

## 2018-10-04 RX ORDER — SODIUM CHLORIDE, SODIUM LACTATE, POTASSIUM CHLORIDE, CALCIUM CHLORIDE 600; 310; 30; 20 MG/100ML; MG/100ML; MG/100ML; MG/100ML
INJECTION, SOLUTION INTRAVENOUS CONTINUOUS
Status: DISCONTINUED | OUTPATIENT
Start: 2018-10-04 | End: 2018-10-04

## 2018-10-04 RX ORDER — NALOXONE HYDROCHLORIDE 0.4 MG/ML
80 INJECTION, SOLUTION INTRAMUSCULAR; INTRAVENOUS; SUBCUTANEOUS AS NEEDED
Status: DISCONTINUED | OUTPATIENT
Start: 2018-10-04 | End: 2018-10-04

## 2018-10-04 NOTE — OPERATIVE REPORT
[facility]OSP - [facility] Endoscopy Report      Preoperative Diagnosis:  - refractory GERD  - history of Crain's esophagus      Postoperative Diagnosis:  - small hiatal hernia  - two tongues of Crain's like mucosa, short segment      Procedure:    Eso

## 2018-10-04 NOTE — ANESTHESIA PREPROCEDURE EVALUATION
Anesthesia PreOp Note    HPI:     Audie Bernheim is a 52year old male who presents for preoperative consultation requested by: Trenton Bess MD    Date of Surgery: 10/4/2018    Procedure(s):  ESOPHAGOGASTRODUODENOSCOPY (EGD)  Indication: Milton Elise Paternal Grandmother          MI age 61   • Other (Brain Cancer) Paternal Grandfather      Social History    Socioeconomic History      Marital status:       Spouse name: Not on file      Number of children: Not on file      Years of education: his pulse is 50. His respiration is 17 and oxygen saturation is 97%.     10/02/18  1139 10/04/18  1052   BP:  140/80   BP Location:  Left arm   Pulse:  50   Resp:  17   SpO2:  97%   Weight: 115.7 kg (255 lb) 115.7 kg (255 lb)   Height: 1.778 m (5' 10\") 1.7

## 2018-10-04 NOTE — H&P
History & Physical Examination    Patient Name: Roger Rodriguez  MRN: [id number]  CSN: [id number]  YOB: 1970    Diagnosis: GERD, history of Crain's     Medications Prior to Admission:  OMEPRAZOLE 40 MG Oral Capsule Delayed Release TAKE 1 UROGENITAL [ ] [ ]    EXTREMITIES [x ] [x ]    OTHER        [ x ] I have discussed the risks and benefits and alternatives with the patient/family. They understand and agree to proceed with plan of care.   [ x ] I have reviewed the History and Physical d

## 2018-10-04 NOTE — ANESTHESIA POSTPROCEDURE EVALUATION
Patient: Pat Ruiz    Procedure Summary     Date:  10/04/18 Room / Location:  [address] [place] [address] ENDOSCOPY 05 / [address] [place] [address] ENDOSCOPY    Anesthesia Start:  2309 Anesthesia Stop:      Procedure:  ESOPHAGOGASTRODUODENOSCOPY (EGD) (N/A ) Diagnosis:       Gastroesophageal ref

## 2018-10-05 VITALS
WEIGHT: 255 LBS | SYSTOLIC BLOOD PRESSURE: 159 MMHG | RESPIRATION RATE: 17 BRPM | OXYGEN SATURATION: 98 % | BODY MASS INDEX: 36.51 KG/M2 | DIASTOLIC BLOOD PRESSURE: 98 MMHG | HEIGHT: 70 IN | HEART RATE: 58 BPM

## 2018-10-18 ENCOUNTER — TELEPHONE (OUTPATIENT)
Dept: GASTROENTEROLOGY | Facility: CLINIC | Age: 48
End: 2018-10-18

## 2018-10-18 NOTE — TELEPHONE ENCOUNTER
----- Message from Medardo Cameron MD sent at 10/17/2018  1:43 PM CDT -----  EGD results - two tongues of Crain's like mucosa. Biopsies negative but there can be sampling error so would repeat EGD in 2 years.   RN to place on call back list.

## 2019-01-07 RX ORDER — OMEPRAZOLE 40 MG/1
CAPSULE, DELAYED RELEASE ORAL
Qty: 90 CAPSULE | Refills: 3 | Status: SHIPPED | OUTPATIENT
Start: 2019-01-07 | End: 2019-12-26

## 2019-01-29 ENCOUNTER — APPOINTMENT (OUTPATIENT)
Dept: GENERAL RADIOLOGY | Age: 49
End: 2019-01-29
Attending: NURSE PRACTITIONER
Payer: COMMERCIAL

## 2019-01-29 ENCOUNTER — HOSPITAL ENCOUNTER (OUTPATIENT)
Age: 49
Discharge: HOME | End: 2019-01-29
Payer: COMMERCIAL

## 2019-01-29 VITALS
BODY MASS INDEX: 37.22 KG/M2 | OXYGEN SATURATION: 96 % | HEIGHT: 70 IN | RESPIRATION RATE: 18 BRPM | HEART RATE: 60 BPM | SYSTOLIC BLOOD PRESSURE: 147 MMHG | WEIGHT: 260 LBS | DIASTOLIC BLOOD PRESSURE: 78 MMHG | TEMPERATURE: 98 F

## 2019-01-29 DIAGNOSIS — J40 BRONCHITIS: Primary | ICD-10-CM

## 2019-01-29 LAB — S PYO AG THROAT QL: NEGATIVE

## 2019-01-29 PROCEDURE — 87430 STREP A AG IA: CPT

## 2019-01-29 PROCEDURE — 99214 OFFICE O/P EST MOD 30 MIN: CPT

## 2019-01-29 PROCEDURE — 93010 ELECTROCARDIOGRAM REPORT: CPT | Performed by: NURSE PRACTITIONER

## 2019-01-29 PROCEDURE — 71046 X-RAY EXAM CHEST 2 VIEWS: CPT | Performed by: NURSE PRACTITIONER

## 2019-01-29 PROCEDURE — 93010 ELECTROCARDIOGRAM REPORT: CPT

## 2019-01-29 PROCEDURE — 93005 ELECTROCARDIOGRAM TRACING: CPT

## 2019-01-29 RX ORDER — AZITHROMYCIN 250 MG/1
TABLET, FILM COATED ORAL
Qty: 1 PACKAGE | Refills: 0 | Status: SHIPPED | OUTPATIENT
Start: 2019-01-29 | End: 2019-02-03

## 2019-01-29 RX ORDER — PREDNISONE 20 MG/1
40 TABLET ORAL DAILY
Qty: 10 TABLET | Refills: 0 | Status: SHIPPED | OUTPATIENT
Start: 2019-01-29 | End: 2019-02-03

## 2019-01-29 RX ORDER — BENZONATATE 100 MG/1
100 CAPSULE ORAL 3 TIMES DAILY PRN
Qty: 30 CAPSULE | Refills: 0 | Status: SHIPPED | OUTPATIENT
Start: 2019-01-29 | End: 2019-02-28

## 2019-01-29 NOTE — ED PROVIDER NOTES
No chief complaint on file. HPI:     Faustino Nicolas is a 50year old male with no significant past medical history presents with sore throat, cough and chest congestion for the last 5 days. +subjective fevers at home. +bodyahces and chills.  Reports with bronchitis. Encouraged pt on supportive care and close f/u with PCP. Will start on prednisone as well as Tessalon Perles. Patient hold antibiotics for 2 days. If no improvement in symptoms he may start a Z-Saurabh at that time.   Any worsening symptoms

## 2019-01-29 NOTE — ED INITIAL ASSESSMENT (HPI)
Sick for 3 days with sore throat, cough, and congestion. No documented fever but c/o chills and body aches. No N/V/D. C/o chest tightness with SOB.

## 2019-07-17 ENCOUNTER — HOSPITAL ENCOUNTER (OUTPATIENT)
Age: 49
Discharge: OTHER INSTITUTION | End: 2019-07-17
Attending: NURSE PRACTITIONER
Payer: COMMERCIAL

## 2019-07-17 ENCOUNTER — HOSPITAL ENCOUNTER (EMERGENCY)
Facility: HOSPITAL | Age: 49
Discharge: HOME | End: 2019-07-17
Attending: EMERGENCY MEDICINE
Payer: COMMERCIAL

## 2019-07-17 ENCOUNTER — APPOINTMENT (OUTPATIENT)
Dept: GENERAL RADIOLOGY | Facility: HOSPITAL | Age: 49
End: 2019-07-17
Attending: EMERGENCY MEDICINE
Payer: COMMERCIAL

## 2019-07-17 VITALS
TEMPERATURE: 97 F | WEIGHT: 250 LBS | OXYGEN SATURATION: 98 % | BODY MASS INDEX: 35.79 KG/M2 | RESPIRATION RATE: 20 BRPM | SYSTOLIC BLOOD PRESSURE: 159 MMHG | DIASTOLIC BLOOD PRESSURE: 96 MMHG | HEART RATE: 82 BPM | HEIGHT: 70 IN

## 2019-07-17 VITALS
RESPIRATION RATE: 18 BRPM | SYSTOLIC BLOOD PRESSURE: 131 MMHG | DIASTOLIC BLOOD PRESSURE: 82 MMHG | TEMPERATURE: 98 F | BODY MASS INDEX: 35.79 KG/M2 | OXYGEN SATURATION: 97 % | WEIGHT: 250 LBS | HEIGHT: 70 IN | HEART RATE: 73 BPM

## 2019-07-17 DIAGNOSIS — T67.8XXA HEAT STRESS SYNDROME, INITIAL ENCOUNTER: Primary | ICD-10-CM

## 2019-07-17 DIAGNOSIS — N17.9 AKI (ACUTE KIDNEY INJURY) (HCC): ICD-10-CM

## 2019-07-17 DIAGNOSIS — T67.9XXA HEAT EXPOSURE, INITIAL ENCOUNTER: Primary | ICD-10-CM

## 2019-07-17 LAB
ALBUMIN SERPL-MCNC: 4.5 G/DL (ref 3.4–5)
ALP LIVER SERPL-CCNC: 100 U/L (ref 45–117)
ALT SERPL-CCNC: 39 U/L (ref 16–61)
ANION GAP SERPL CALC-SCNC: 12 MMOL/L (ref 0–18)
AST SERPL-CCNC: 30 U/L (ref 15–37)
BASOPHILS # BLD AUTO: 0.02 X10(3) UL (ref 0–0.2)
BASOPHILS NFR BLD AUTO: 0.2 %
BILIRUB DIRECT SERPL-MCNC: 0.3 MG/DL (ref 0–0.2)
BILIRUB SERPL-MCNC: 1.2 MG/DL (ref 0.1–2)
BUN BLD-MCNC: 30 MG/DL (ref 7–18)
BUN/CREAT SERPL: 15.7 (ref 10–20)
CALCIUM BLD-MCNC: 9.3 MG/DL (ref 8.5–10.1)
CHLORIDE SERPL-SCNC: 99 MMOL/L (ref 98–112)
CK SERPL-CCNC: 169 U/L (ref 39–308)
CLARITY UR: CLEAR
CO2 SERPL-SCNC: 22 MMOL/L (ref 21–32)
COLOR UR: YELLOW
CREAT BLD-MCNC: 1.91 MG/DL (ref 0.7–1.3)
CREAT BLD-MCNC: 2.1 MG/DL (ref 0.7–1.3)
DEPRECATED RDW RBC AUTO: 39.6 FL (ref 35.1–46.3)
EOSINOPHIL # BLD AUTO: 0.06 X10(3) UL (ref 0–0.7)
EOSINOPHIL NFR BLD AUTO: 0.7 %
ERYTHROCYTE [DISTWIDTH] IN BLOOD BY AUTOMATED COUNT: 12.8 % (ref 11–15)
GLUCOSE BLD-MCNC: 115 MG/DL (ref 70–99)
GLUCOSE BLD-MCNC: 115 MG/DL (ref 70–99)
GLUCOSE UR STRIP-MCNC: NEGATIVE MG/DL
HCT VFR BLD AUTO: 44.7 % (ref 39–53)
HGB BLD-MCNC: 15 G/DL (ref 13–17.5)
HGB UR QL STRIP: NEGATIVE
IMM GRANULOCYTES # BLD AUTO: 0.03 X10(3) UL (ref 0–1)
IMM GRANULOCYTES NFR BLD: 0.3 %
ISTAT BUN: 31 MG/DL (ref 8–20)
ISTAT CHLORIDE: 97 MMOL/L (ref 101–111)
ISTAT HEMATOCRIT: 49 % (ref 37–53)
ISTAT IONIZED CALCIUM FOR CHEM 8: 1.11 MMOL/L (ref 1.12–1.32)
ISTAT POTASSIUM: 3.8 MMOL/L (ref 3.6–5.1)
ISTAT SODIUM: 131 MMOL/L (ref 136–145)
ISTAT TCO2: 21 MMOL/L (ref 22–32)
LEUKOCYTE ESTERASE UR QL STRIP: NEGATIVE
LYMPHOCYTES # BLD AUTO: 1.57 X10(3) UL (ref 1–4)
LYMPHOCYTES NFR BLD AUTO: 17.3 %
M PROTEIN MFR SERPL ELPH: 7.9 G/DL (ref 6.4–8.2)
MCH RBC QN AUTO: 28.4 PG (ref 26–34)
MCHC RBC AUTO-ENTMCNC: 33.6 G/DL (ref 31–37)
MCV RBC AUTO: 84.7 FL (ref 80–100)
MONOCYTES # BLD AUTO: 0.57 X10(3) UL (ref 0.1–1)
MONOCYTES NFR BLD AUTO: 6.3 %
NEUTROPHILS # BLD AUTO: 6.85 X10 (3) UL (ref 1.5–7.7)
NEUTROPHILS # BLD AUTO: 6.85 X10(3) UL (ref 1.5–7.7)
NEUTROPHILS NFR BLD AUTO: 75.2 %
NITRITE UR QL STRIP: NEGATIVE
OSMOLALITY SERPL CALC.SUM OF ELEC: 283 MOSM/KG (ref 275–295)
PH UR STRIP: 5.5 [PH]
PLATELET # BLD AUTO: 222 10(3)UL (ref 150–450)
POTASSIUM SERPL-SCNC: 3.5 MMOL/L (ref 3.5–5.1)
PROT UR STRIP-MCNC: 100 MG/DL
RBC # BLD AUTO: 5.28 X10(6)UL (ref 4.3–5.7)
SODIUM SERPL-SCNC: 133 MMOL/L (ref 136–145)
SP GR UR STRIP: >=1.03
TROPONIN I SERPL-MCNC: <0.045 NG/ML (ref ?–0.04)
UROBILINOGEN UR STRIP-ACNC: <2 MG/DL
WBC # BLD AUTO: 9.1 X10(3) UL (ref 4–11)

## 2019-07-17 PROCEDURE — 80076 HEPATIC FUNCTION PANEL: CPT | Performed by: EMERGENCY MEDICINE

## 2019-07-17 PROCEDURE — 96360 HYDRATION IV INFUSION INIT: CPT

## 2019-07-17 PROCEDURE — 93010 ELECTROCARDIOGRAM REPORT: CPT | Performed by: EMERGENCY MEDICINE

## 2019-07-17 PROCEDURE — 85025 COMPLETE CBC W/AUTO DIFF WBC: CPT | Performed by: EMERGENCY MEDICINE

## 2019-07-17 PROCEDURE — 93010 ELECTROCARDIOGRAM REPORT: CPT | Performed by: NURSE PRACTITIONER

## 2019-07-17 PROCEDURE — 84484 ASSAY OF TROPONIN QUANT: CPT | Performed by: EMERGENCY MEDICINE

## 2019-07-17 PROCEDURE — 81002 URINALYSIS NONAUTO W/O SCOPE: CPT

## 2019-07-17 PROCEDURE — 93005 ELECTROCARDIOGRAM TRACING: CPT

## 2019-07-17 PROCEDURE — 71101 X-RAY EXAM UNILAT RIBS/CHEST: CPT | Performed by: EMERGENCY MEDICINE

## 2019-07-17 PROCEDURE — 99214 OFFICE O/P EST MOD 30 MIN: CPT

## 2019-07-17 PROCEDURE — 82550 ASSAY OF CK (CPK): CPT | Performed by: EMERGENCY MEDICINE

## 2019-07-17 PROCEDURE — 80048 BASIC METABOLIC PNL TOTAL CA: CPT | Performed by: EMERGENCY MEDICINE

## 2019-07-17 PROCEDURE — 99284 EMERGENCY DEPT VISIT MOD MDM: CPT

## 2019-07-17 PROCEDURE — 93010 ELECTROCARDIOGRAM REPORT: CPT

## 2019-07-17 PROCEDURE — 80047 BASIC METABLC PNL IONIZED CA: CPT

## 2019-07-17 RX ORDER — 0.9 % SODIUM CHLORIDE 0.9 %
1000 INTRAVENOUS SOLUTION INTRAVENOUS ONCE
Status: COMPLETED | OUTPATIENT
Start: 2019-07-17 | End: 2019-07-17

## 2019-07-17 NOTE — ED PROVIDER NOTES
Patient Seen in: [facility] Emergency Department    History   Patient presents with:  Arrythmia/Palpitations (cardiovascular)    Stated Complaint: palpitations, chest pain     HPI    51 yo M with PMH HL, GERD presenting for evaluation of lightheaded Yes      Comment: 6 beers daily    Drug use: No      Review of Systems :  Constitutional: As per HPI  Respiratory: Negative for cough and shortness of breath. Cardiovascular: Negative for chest pain; (+) palpitations.      Positive for stated complaint: -----------         ------                     CBC W/ DIFFERENTIAL[[id number]]                              Final result                 Please view results for these tests on the individual orders.    [address] heat exhaustion, heat exposure, rhabdomyolysis.     Pulse ox: 99%:Normal on RA, as interpreted by myself    Cardiac Monitor Interpretation: Pulse Readings from Last 1 Encounters:  07/17/19 : 80  , sinus,      Evaluation for heat exposure in patient with steffi

## 2019-07-17 NOTE — ED PROVIDER NOTES
Patient presents with:  Dizziness (neurologic)      HPI:     This 50year old male with no significant past medical history presents with a chief complaint of body cramps, intermittent dizziness for the last few hours.   Patient reports he was outside [employer] time.  IV fluids infusing at transfer. 1625-patient now mentioned that this is the first time he has urinated today. Also reports he has had some \"irregular heart beat\".       Orders Placed This Encounter      EKG 12 Lead      POCT Urinalysis Dipstic specified. Disposition:  Transfer to Emergency Department via 911. Emergency Department noted by nursing staff.

## 2019-07-17 NOTE — ED NOTES
Late entry d/t care provided:    Received pt a/ox3, clear speech, nad, no resp distress, ambulatory with steady gait  Here with c/o cramping, lightheadedness and heart palpitations while working outside in heat for hours.  Denies cp or sob  Pt sent from [address] [place] [address]

## 2019-07-17 NOTE — ED INITIAL ASSESSMENT (HPI)
PATIENT ARRIVED AMBULATORY TO ROOM C/O \"ALL OVER BODY CRAMPS\" PATIENT STATES \"I WAS OUT IN THE SUN ALL DAY AND I SWEAT A LOT\" +INTERMITTENT DIZZINESS

## 2019-10-10 ENCOUNTER — TELEPHONE (OUTPATIENT)
Dept: GASTROENTEROLOGY | Facility: CLINIC | Age: 49
End: 2019-10-10

## 2019-10-10 NOTE — TELEPHONE ENCOUNTER
From 10/18/18 encounter, pt did not call back, although it appears he read result on [employer]. Left message on voicemail for call back. Want to make sure pt aware.      Result Notes for SURGICAL PATHOLOGY TISSUE     Notes recorded by Leah Joy RN on

## 2019-12-26 RX ORDER — OMEPRAZOLE 40 MG/1
CAPSULE, DELAYED RELEASE ORAL
Qty: 90 CAPSULE | Refills: 3 | Status: SHIPPED | OUTPATIENT
Start: 2019-12-26 | End: 2021-01-26

## 2019-12-26 NOTE — TELEPHONE ENCOUNTER
Requested Prescriptions     Pending Prescriptions Disp Refills   • OMEPRAZOLE 40 MG Oral Capsule Delayed Release [Pharmacy Med Name: OMEPRAZOLE 40MG CAPSULES] 90 capsule 3     Sig: TAKE 1 CAPSULE BY MOUTH DAILY     Last seen EGD 10/04/18  LR 1/07/19    em

## 2020-03-17 ENCOUNTER — HOSPITAL ENCOUNTER (OUTPATIENT)
Age: 50
Discharge: HOME | End: 2020-03-17
Payer: COMMERCIAL

## 2020-03-17 ENCOUNTER — APPOINTMENT (OUTPATIENT)
Dept: GENERAL RADIOLOGY | Age: 50
End: 2020-03-17
Attending: NURSE PRACTITIONER
Payer: COMMERCIAL

## 2020-03-17 VITALS
DIASTOLIC BLOOD PRESSURE: 86 MMHG | OXYGEN SATURATION: 96 % | SYSTOLIC BLOOD PRESSURE: 148 MMHG | HEART RATE: 61 BPM | BODY MASS INDEX: 34.36 KG/M2 | TEMPERATURE: 99 F | WEIGHT: 240 LBS | RESPIRATION RATE: 20 BRPM | HEIGHT: 70 IN

## 2020-03-17 DIAGNOSIS — J02.9 ACUTE SORE THROAT: Primary | ICD-10-CM

## 2020-03-17 PROCEDURE — 99213 OFFICE O/P EST LOW 20 MIN: CPT

## 2020-03-17 PROCEDURE — 71046 X-RAY EXAM CHEST 2 VIEWS: CPT | Performed by: NURSE PRACTITIONER

## 2020-03-17 NOTE — ED PROVIDER NOTES
Patient presents with:  Nasal Congestion      HPI:     Reji Carpenter is a 52year old male history of GERD, hyperlipidemia and Crain's esophagus presents with a chief complaint of runny nose, itchy eyes, right-sided chest congestion and cough.   He sta need for close follow-up with primary care provider. I did discuss with the patient that sore throat is most likely viral in etiology or secondary to postnasal drip. I will plan any antibiotics are warranted at this time.   We did discuss supportive care

## 2020-03-17 NOTE — ED INITIAL ASSESSMENT (HPI)
PATIENT ARRIVED AMBULATORY TO ROOM C/O SYMPTOMS THAT STARTED 3 DAYS AGO. +NASA CONGESTION. +ITCHY EYES. PATIENT STATES \"I THINK ITS ALLERGIES BUT THEY SAID I HAD TO GET CHECKED OUT\" NO N/V/D. SLIGHT COUGH. EASY NON LABORED RESPIRATIONS.  NO FEVERS

## 2020-03-19 ENCOUNTER — HOSPITAL ENCOUNTER (OUTPATIENT)
Age: 50
Discharge: HOME | End: 2020-03-19
Attending: EMERGENCY MEDICINE
Payer: COMMERCIAL

## 2020-03-19 VITALS
HEIGHT: 70 IN | SYSTOLIC BLOOD PRESSURE: 114 MMHG | DIASTOLIC BLOOD PRESSURE: 78 MMHG | WEIGHT: 238 LBS | RESPIRATION RATE: 16 BRPM | HEART RATE: 78 BPM | OXYGEN SATURATION: 97 % | TEMPERATURE: 98 F | BODY MASS INDEX: 34.07 KG/M2

## 2020-03-19 DIAGNOSIS — R05.9 COUGH: Primary | ICD-10-CM

## 2020-03-19 PROCEDURE — 99213 OFFICE O/P EST LOW 20 MIN: CPT

## 2020-03-19 PROCEDURE — 99214 OFFICE O/P EST MOD 30 MIN: CPT

## 2020-03-19 RX ORDER — PREDNISONE 20 MG/1
40 TABLET ORAL DAILY
Qty: 10 TABLET | Refills: 0 | Status: SHIPPED | OUTPATIENT
Start: 2020-03-19 | End: 2020-03-24

## 2020-03-19 RX ORDER — AZITHROMYCIN 250 MG/1
TABLET, FILM COATED ORAL
Qty: 1 PACKAGE | Refills: 0 | Status: SHIPPED | OUTPATIENT
Start: 2020-03-19 | End: 2020-03-24

## 2020-03-19 NOTE — ED PROVIDER NOTES
Patient Seen in: [facility]      History   Patient presents with:  Cough/URI    Stated Complaint: allergies    HPI    Patient complains of cough.   The patient was evaluated here 2 days ago complaining of sore throat and c normal appearance.   On exam of throat there is mild pharyngeal erythema  Neck there is positive submandibular tenderness without adenopathy palpable  Lungs are clear to auscultation  Cardiac there are normal first and second heart sounds  Neurologic there

## 2020-03-19 NOTE — ED INITIAL ASSESSMENT (HPI)
PATIENT SEEN IN IC ON 3/17 FOR CHIEF COMPLAINT OF \"ALLERGIES. \"  REPORTS 10 DAY HX OF ITCHY EYES AND RUNNY NOSE. STATES HE FEELS LIKE SYMPTOMS HAVE \"MOVED\" TO HIS CHEST. DENIES FEVERS.   REPORTS HX OF BRONCHITIS WITH SIMILAR SYMPTOMS AND STATES HE IMPR

## 2020-07-07 ENCOUNTER — OFFICE VISIT (OUTPATIENT)
Dept: INTERNAL MEDICINE CLINIC | Facility: CLINIC | Age: 50
End: 2020-07-07
Payer: COMMERCIAL

## 2020-07-07 VITALS
WEIGHT: 234.81 LBS | SYSTOLIC BLOOD PRESSURE: 142 MMHG | BODY MASS INDEX: 33.62 KG/M2 | HEART RATE: 79 BPM | TEMPERATURE: 98 F | HEIGHT: 70 IN | DIASTOLIC BLOOD PRESSURE: 93 MMHG

## 2020-07-07 DIAGNOSIS — Z12.11 COLON CANCER SCREENING: ICD-10-CM

## 2020-07-07 DIAGNOSIS — E66.1 CLASS 1 DRUG-INDUCED OBESITY WITHOUT SERIOUS COMORBIDITY WITH BODY MASS INDEX (BMI) OF 33.0 TO 33.9 IN ADULT: ICD-10-CM

## 2020-07-07 DIAGNOSIS — K22.710 BARRETT'S ESOPHAGUS WITH LOW GRADE DYSPLASIA: ICD-10-CM

## 2020-07-07 DIAGNOSIS — F10.10 ALCOHOL ABUSE: ICD-10-CM

## 2020-07-07 DIAGNOSIS — E78.00 HYPERCHOLESTEROLEMIA: ICD-10-CM

## 2020-07-07 DIAGNOSIS — R00.2 PALPITATIONS: ICD-10-CM

## 2020-07-07 DIAGNOSIS — Z00.00 ANNUAL PHYSICAL EXAM: Primary | ICD-10-CM

## 2020-07-07 DIAGNOSIS — K21.00 GASTROESOPHAGEAL REFLUX DISEASE WITH ESOPHAGITIS: ICD-10-CM

## 2020-07-07 PROBLEM — E66.811 CLASS 1 DRUG-INDUCED OBESITY WITH BODY MASS INDEX (BMI) OF 33.0 TO 33.9 IN ADULT: Status: ACTIVE | Noted: 2020-07-07

## 2020-07-07 PROBLEM — Z82.49 FH: CARDIOVASCULAR DISEASE: Status: ACTIVE | Noted: 2020-07-07

## 2020-07-07 PROCEDURE — 99214 OFFICE O/P EST MOD 30 MIN: CPT | Performed by: INTERNAL MEDICINE

## 2020-07-07 PROCEDURE — 99396 PREV VISIT EST AGE 40-64: CPT | Performed by: INTERNAL MEDICINE

## 2020-07-07 NOTE — PATIENT INSTRUCTIONS
Prevention Guidelines, Men Ages 36 to 52  Screening tests and vaccines are an important part of managing your health. A screening test is done to find possible disorders or diseases in people who don't have any symptoms.  The goal is to find a disease early talk with your healthcare provider At routine exams   Tuberculosis Men at increased risk for infection – talk with your healthcare provider Check with your healthcare provider   Vision All men in this age group Every 2 to 4 years if no risk factors for eye then at routine exams   Sexually transmitted infection prevention Men at increased risk for infection – talk with your healthcare provider At routine exams   Use of daily aspirin Men ages 39 to 78 at risk for cardiovascular health problems At routine exams your risk of disease. They lower the good cholesterol and raise the level of bad cholesterol. Bad fats are found in animal products, including meat, whole-milk dairy products, and butter. Some plants are also high in bad fats (coconut and palm plants).  Tra lower both cholesterol and triglyceride levels. (Triglycerides are another fat found in the blood.) Walnuts are one of the best sources of omega-3 fatty acids. · Eat plenty of fresh fruits and vegetables daily.   · Avoid fast foods and commercial baked goo This can lead to:   · Trouble breathing  · Irregular heartbeat  · Atrial fibrillation  · Leg swelling  · Heart failure  Alcohol also makes the blood vessels stiff. This causes high blood pressure.    All of these problems raise your risk of having a heart a program.  · Join a self-help group for alcohol abuse such as Alcoholics Anonymous. · Stay away from people who abuse alcohol or tempt you to drink. Follow-up care   Follow up with your healthcare provider, or as advised.  Contact these groups to get help:

## 2020-07-09 ENCOUNTER — TELEPHONE (OUTPATIENT)
Dept: GASTROENTEROLOGY | Facility: CLINIC | Age: 50
End: 2020-07-09

## 2020-07-09 DIAGNOSIS — K52.9 COLITIS: ICD-10-CM

## 2020-07-09 DIAGNOSIS — K22.719 BARRETT'S ESOPHAGUS WITH DYSPLASIA: Primary | ICD-10-CM

## 2020-07-09 NOTE — TELEPHONE ENCOUNTER
Pt called to schedule repeat EGD and he also states that Dr. Reyes Payment advised that he should have colonoscopy done (pt didn't think he was due until 2025).   Please call pt after 2:30pm.

## 2020-07-09 NOTE — TELEPHONE ENCOUNTER
Dr. Ana Jean    Patient calling to schedule EGD - will be due for 2 year recall 10/4/2020. Please provide orders if you would like us to help schedule patient for EGD.     Also, Dr. Aurelia Ortiz advised patient that he should have a colonoscopy done.  (patient thoug Right colon; biopsy:     *  Multiple fragments of colonic mucosa with mild chronic active colitis,   lamina propria lymphoplasmacytosis, and focal mild architecture disorder.     *  No evidence of granuloma, dysplasia or malignancy, is identified.       Co 04/17/15 10:51 AM        Lab Flowsheet      Order Details      View Encounter      Lab and Collection Details      Routing

## 2020-07-10 ENCOUNTER — LAB ENCOUNTER (OUTPATIENT)
Dept: LAB | Facility: HOSPITAL | Age: 50
End: 2020-07-10
Attending: INTERNAL MEDICINE
Payer: COMMERCIAL

## 2020-07-10 ENCOUNTER — TELEPHONE (OUTPATIENT)
Dept: CARDIOLOGY CLINIC | Facility: CLINIC | Age: 50
End: 2020-07-10

## 2020-07-10 ENCOUNTER — LAB ENCOUNTER (OUTPATIENT)
Dept: LAB | Facility: REFERENCE LAB | Age: 50
End: 2020-07-10
Attending: INTERNAL MEDICINE
Payer: COMMERCIAL

## 2020-07-10 DIAGNOSIS — R00.2 PALPITATIONS: ICD-10-CM

## 2020-07-10 DIAGNOSIS — E78.00 HYPERCHOLESTEROLEMIA: ICD-10-CM

## 2020-07-10 DIAGNOSIS — Z00.00 ANNUAL PHYSICAL EXAM: ICD-10-CM

## 2020-07-10 DIAGNOSIS — R00.2 PALPITATIONS: Primary | ICD-10-CM

## 2020-07-10 LAB
ALBUMIN SERPL-MCNC: 4.1 G/DL (ref 3.4–5)
ALBUMIN/GLOB SERPL: 1.2 {RATIO} (ref 1–2)
ALP LIVER SERPL-CCNC: 76 U/L (ref 45–117)
ALT SERPL-CCNC: 31 U/L (ref 16–61)
ANION GAP SERPL CALC-SCNC: 6 MMOL/L (ref 0–18)
AST SERPL-CCNC: 22 U/L (ref 15–37)
BASOPHILS # BLD AUTO: 0.04 X10(3) UL (ref 0–0.2)
BASOPHILS NFR BLD AUTO: 0.6 %
BILIRUB SERPL-MCNC: 0.7 MG/DL (ref 0.1–2)
BUN BLD-MCNC: 33 MG/DL (ref 7–18)
BUN/CREAT SERPL: 28 (ref 10–20)
CALCIUM BLD-MCNC: 9.4 MG/DL (ref 8.5–10.1)
CHLORIDE SERPL-SCNC: 106 MMOL/L (ref 98–112)
CHOLEST SMN-MCNC: 139 MG/DL (ref ?–200)
CO2 SERPL-SCNC: 28 MMOL/L (ref 21–32)
CREAT BLD-MCNC: 1.18 MG/DL (ref 0.7–1.3)
DEPRECATED RDW RBC AUTO: 40.2 FL (ref 35.1–46.3)
EOSINOPHIL # BLD AUTO: 0.28 X10(3) UL (ref 0–0.7)
EOSINOPHIL NFR BLD AUTO: 4.2 %
ERYTHROCYTE [DISTWIDTH] IN BLOOD BY AUTOMATED COUNT: 12.9 % (ref 11–15)
GLOBULIN PLAS-MCNC: 3.3 G/DL (ref 2.8–4.4)
GLUCOSE BLD-MCNC: 96 MG/DL (ref 70–99)
HCT VFR BLD AUTO: 44.8 % (ref 39–53)
HDLC SERPL-MCNC: 48 MG/DL (ref 40–59)
HGB BLD-MCNC: 15.1 G/DL (ref 13–17.5)
IMM GRANULOCYTES # BLD AUTO: 0.02 X10(3) UL (ref 0–1)
IMM GRANULOCYTES NFR BLD: 0.3 %
LDLC SERPL CALC-MCNC: 73 MG/DL (ref ?–100)
LYMPHOCYTES # BLD AUTO: 2.12 X10(3) UL (ref 1–4)
LYMPHOCYTES NFR BLD AUTO: 31.5 %
M PROTEIN MFR SERPL ELPH: 7.4 G/DL (ref 6.4–8.2)
MCH RBC QN AUTO: 29.1 PG (ref 26–34)
MCHC RBC AUTO-ENTMCNC: 33.7 G/DL (ref 31–37)
MCV RBC AUTO: 86.3 FL (ref 80–100)
MONOCYTES # BLD AUTO: 0.56 X10(3) UL (ref 0.1–1)
MONOCYTES NFR BLD AUTO: 8.3 %
NEUTROPHILS # BLD AUTO: 3.7 X10 (3) UL (ref 1.5–7.7)
NEUTROPHILS # BLD AUTO: 3.7 X10(3) UL (ref 1.5–7.7)
NEUTROPHILS NFR BLD AUTO: 55.1 %
NONHDLC SERPL-MCNC: 91 MG/DL (ref ?–130)
OSMOLALITY SERPL CALC.SUM OF ELEC: 297 MOSM/KG (ref 275–295)
PATIENT FASTING Y/N/NP: YES
PATIENT FASTING Y/N/NP: YES
PLATELET # BLD AUTO: 225 10(3)UL (ref 150–450)
POTASSIUM SERPL-SCNC: 3.5 MMOL/L (ref 3.5–5.1)
RBC # BLD AUTO: 5.19 X10(6)UL (ref 4.3–5.7)
SODIUM SERPL-SCNC: 140 MMOL/L (ref 136–145)
TRIGL SERPL-MCNC: 92 MG/DL (ref 30–149)
TSI SER-ACNC: 1 MIU/ML (ref 0.36–3.74)
VLDLC SERPL CALC-MCNC: 18 MG/DL (ref 0–30)
WBC # BLD AUTO: 6.7 X10(3) UL (ref 4–11)

## 2020-07-10 PROCEDURE — 84443 ASSAY THYROID STIM HORMONE: CPT

## 2020-07-10 PROCEDURE — 80053 COMPREHEN METABOLIC PANEL: CPT

## 2020-07-10 PROCEDURE — 85025 COMPLETE CBC W/AUTO DIFF WBC: CPT

## 2020-07-10 PROCEDURE — 93005 ELECTROCARDIOGRAM TRACING: CPT

## 2020-07-10 PROCEDURE — 36415 COLL VENOUS BLD VENIPUNCTURE: CPT

## 2020-07-10 PROCEDURE — 80061 LIPID PANEL: CPT

## 2020-07-10 PROCEDURE — 93010 ELECTROCARDIOGRAM REPORT: CPT | Performed by: INTERNAL MEDICINE

## 2020-07-10 NOTE — TELEPHONE ENCOUNTER
Schedulers please schedule patient for EGD and Colonoscopy per below orders from Dr. Zaid Low    Thank you

## 2020-07-10 NOTE — TELEPHONE ENCOUNTER
Chart reviewed, he did have some irritation on prior colonoscopy - we could set up during EGD ( for Barretts) - colyte prep and MAC sedation.   Dx colon - colitis

## 2020-07-10 NOTE — PROGRESS NOTES
Patient ID: Harl Cowden is a 52year old male. Patient presents with:  Physical: Pt states annual px  Other: Pt would like to discuss foot problem, and possible hernia       HISTORY OF PRESENT ILLNESS:   HPI  Patient presents for above.   Last seen i Constitutional: Negative. HENT: Negative. Eyes: Negative. Respiratory: Negative. Cardiovascular: Positive for palpitations. Gastrointestinal: Negative. Endocrine: Negative. Genitourinary: Negative.     Musculoskeletal: Positive for art Quit date: 2010        Years since quittin.7      Smokeless tobacco: Never Used    Substance and Sexual Activity      Alcohol use: Yes        Comment: 6 beers daily      Drug use: No      Sexual activity: Not on file    Lifestyle      Physical ac well-nourished. HENT:   Head: Normocephalic and atraumatic. Mouth/Throat: No oropharyngeal exudate. Eyes: Pupils are equal, round, and reactive to light. EOM are normal. No scleral icterus. Neck: Normal range of motion. Neck supple.  No thyromegaly weeks (around 8/4/2020) for Routine Follow-Up.     Michael Gregory MD  7/9/2020

## 2020-07-10 NOTE — TELEPHONE ENCOUNTER
Pt. states that PCP would like the pt. to be seen sooner then 1st avail. for consult for Palpitations.

## 2020-07-10 NOTE — TELEPHONE ENCOUNTER
Last Procedure, Date, MD:  Dr. Bernard Ayala 4/17/15  Last Diagnosis:  Barretts  Recalled for (mth/yrs): 2 year recall for EGD  Sedation used previously:  unknown  Last Prep Used (if known):  unknown  Quality of prep (if known): unknown  Anticoagulants:no  Diabeti

## 2020-07-16 NOTE — TELEPHONE ENCOUNTER
PA Dept--    This patient is scheduled    I sent a referral to [facility], please contact this patients [address] [place] [address] to check for a PA.  Thank you    You may close this TE when done :)

## 2020-07-16 NOTE — TELEPHONE ENCOUNTER
Scheduled for:  Colonoscopy [phone] and EGD 33220  Provider Name:  Dr. Virginia Mendiola  Date:  8/4/20   Location:    [address] [place] [address] 1  Sedation:  MAC  Time:  0730 (pt is aware to arrive at 0630)   Prep:  Colyte, sent via [employer]  Meds/Allergies Reconciled?:  Physician reviewed

## 2020-07-17 ENCOUNTER — HOSPITAL ENCOUNTER (OUTPATIENT)
Dept: CV DIAGNOSTICS | Facility: HOSPITAL | Age: 50
Discharge: HOME | End: 2020-07-17
Attending: INTERNAL MEDICINE
Payer: COMMERCIAL

## 2020-07-17 DIAGNOSIS — R00.2 PALPITATIONS: ICD-10-CM

## 2020-07-17 PROCEDURE — 93306 TTE W/DOPPLER COMPLETE: CPT | Performed by: INTERNAL MEDICINE

## 2020-07-21 NOTE — TELEPHONE ENCOUNTER
Scheduled for:  Colonoscopy [id number] and [phone]  Provider Name:  Dr. Amee Peterson  Date:  8/4/20   Location:    [address] [place] [address],[address] [phone] contacted and per  Bernard ROBLES required for CLN or EGD outpt.  Call ref# [id number]  Referral

## 2020-07-28 ENCOUNTER — TELEPHONE (OUTPATIENT)
Dept: GASTROENTEROLOGY | Facility: CLINIC | Age: 50
End: 2020-07-28

## 2020-07-28 DIAGNOSIS — K52.9 COLITIS: ICD-10-CM

## 2020-07-28 DIAGNOSIS — K22.719 BARRETT'S ESOPHAGUS WITH DYSPLASIA: Primary | ICD-10-CM

## 2020-07-28 NOTE — TELEPHONE ENCOUNTER
Scheduled for:  Colonoscopy [address] and EGD [address]  Provider Name:  Dr. Sid Calixto  Date:  8/4/20   [facility]  Sedation:  MAC  Time:  0730 (pt is aware to arrive at 0630)   Prep:  Colyte, sent new instructions via [employer]  Meds/Allergies José Manuel

## 2020-08-01 ENCOUNTER — LAB ENCOUNTER (OUTPATIENT)
Dept: LAB | Facility: HOSPITAL | Age: 50
End: 2020-08-01
Attending: INTERNAL MEDICINE
Payer: COMMERCIAL

## 2020-08-01 DIAGNOSIS — K52.9 COLITIS: ICD-10-CM

## 2020-08-01 DIAGNOSIS — K22.719 BARRETT'S ESOPHAGUS WITH DYSPLASIA: ICD-10-CM

## 2020-08-01 DIAGNOSIS — Z01.818 PRE-OP TESTING: ICD-10-CM

## 2020-08-01 RX ORDER — POLYETHYLENE GLYCOL 3350, SODIUM CHLORIDE, POTASSIUM CHLORIDE, SODIUM BICARBONATE, AND SODIUM SULFATE 240; 5.84; 2.98; 6.72; 22.72 G/4L; G/4L; G/4L; G/4L; G/4L
POWDER, FOR SOLUTION ORAL
Qty: 1 BOTTLE | Refills: 0 | Status: SHIPPED | OUTPATIENT
Start: 2020-08-01

## 2020-08-02 LAB — SARS-COV-2 RNA RESP QL NAA+PROBE: NOT DETECTED

## 2020-08-04 ENCOUNTER — ANESTHESIA (OUTPATIENT)
Dept: ENDOSCOPY | Facility: HOSPITAL | Age: 50
End: 2020-08-04
Payer: COMMERCIAL

## 2020-08-04 ENCOUNTER — ANESTHESIA EVENT (OUTPATIENT)
Dept: ENDOSCOPY | Facility: HOSPITAL | Age: 50
End: 2020-08-04
Payer: COMMERCIAL

## 2020-08-04 ENCOUNTER — HOSPITAL ENCOUNTER (OUTPATIENT)
Facility: HOSPITAL | Age: 50
Setting detail: HOSPITAL OUTPATIENT SURGERY
Discharge: HOME | End: 2020-08-04
Attending: INTERNAL MEDICINE | Admitting: INTERNAL MEDICINE
Payer: COMMERCIAL

## 2020-08-04 VITALS
HEART RATE: 59 BPM | DIASTOLIC BLOOD PRESSURE: 81 MMHG | HEIGHT: 70 IN | WEIGHT: 230 LBS | SYSTOLIC BLOOD PRESSURE: 112 MMHG | OXYGEN SATURATION: 97 % | RESPIRATION RATE: 11 BRPM | TEMPERATURE: 99 F | BODY MASS INDEX: 32.93 KG/M2

## 2020-08-04 DIAGNOSIS — K22.719 BARRETT'S ESOPHAGUS WITH DYSPLASIA: Primary | ICD-10-CM

## 2020-08-04 DIAGNOSIS — K64.9 HEMORRHOIDS: ICD-10-CM

## 2020-08-04 DIAGNOSIS — Z01.818 PRE-OP TESTING: ICD-10-CM

## 2020-08-04 DIAGNOSIS — K44.9 HIATAL HERNIA: ICD-10-CM

## 2020-08-04 DIAGNOSIS — K52.9 COLITIS: ICD-10-CM

## 2020-08-04 PROCEDURE — 0DB48ZX EXCISION OF ESOPHAGOGASTRIC JUNCTION, VIA NATURAL OR ARTIFICIAL OPENING ENDOSCOPIC, DIAGNOSTIC: ICD-10-PCS | Performed by: INTERNAL MEDICINE

## 2020-08-04 PROCEDURE — 43239 EGD BIOPSY SINGLE/MULTIPLE: CPT | Performed by: INTERNAL MEDICINE

## 2020-08-04 PROCEDURE — 45380 COLONOSCOPY AND BIOPSY: CPT | Performed by: INTERNAL MEDICINE

## 2020-08-04 PROCEDURE — 0DBE8ZX EXCISION OF LARGE INTESTINE, VIA NATURAL OR ARTIFICIAL OPENING ENDOSCOPIC, DIAGNOSTIC: ICD-10-PCS | Performed by: INTERNAL MEDICINE

## 2020-08-04 RX ORDER — SODIUM CHLORIDE, SODIUM LACTATE, POTASSIUM CHLORIDE, CALCIUM CHLORIDE 600; 310; 30; 20 MG/100ML; MG/100ML; MG/100ML; MG/100ML
INJECTION, SOLUTION INTRAVENOUS CONTINUOUS
Status: CANCELLED | OUTPATIENT
Start: 2020-08-04

## 2020-08-04 RX ORDER — LIDOCAINE HYDROCHLORIDE 10 MG/ML
INJECTION, SOLUTION EPIDURAL; INFILTRATION; INTRACAUDAL; PERINEURAL AS NEEDED
Status: DISCONTINUED | OUTPATIENT
Start: 2020-08-04 | End: 2020-08-04 | Stop reason: SURG

## 2020-08-04 RX ORDER — NALOXONE HYDROCHLORIDE 0.4 MG/ML
80 INJECTION, SOLUTION INTRAMUSCULAR; INTRAVENOUS; SUBCUTANEOUS AS NEEDED
Status: CANCELLED | OUTPATIENT
Start: 2020-08-04 | End: 2020-08-04

## 2020-08-04 RX ORDER — SODIUM CHLORIDE, SODIUM LACTATE, POTASSIUM CHLORIDE, CALCIUM CHLORIDE 600; 310; 30; 20 MG/100ML; MG/100ML; MG/100ML; MG/100ML
INJECTION, SOLUTION INTRAVENOUS CONTINUOUS
Status: DISCONTINUED | OUTPATIENT
Start: 2020-08-04 | End: 2020-08-04

## 2020-08-04 RX ADMIN — LIDOCAINE HYDROCHLORIDE 25 MG: 10 INJECTION, SOLUTION EPIDURAL; INFILTRATION; INTRACAUDAL; PERINEURAL at 07:49:00

## 2020-08-04 RX ADMIN — SODIUM CHLORIDE, SODIUM LACTATE, POTASSIUM CHLORIDE, CALCIUM CHLORIDE: 600; 310; 30; 20 INJECTION, SOLUTION INTRAVENOUS at 08:22:00

## 2020-08-04 NOTE — ANESTHESIA PREPROCEDURE EVALUATION
Anesthesia PreOp Note    HPI:     Sebastien tSoner is a 52year old male who presents for preoperative consultation requested by: Summer Cardona MD    Date of Surgery: 8/4/2020    Procedure(s):  COLONOSCOPY  ESOPHAGOGASTRODUODENOSCOPY (EGD)  Indication: Disp: 1 Bottle, Rfl: 0, 8/4/2020      lactated ringers infusion, , Intravenous, Continuous, Gina Andrade MD, Last Rate: 20 mL/hr at 08/04/20 0700    No current [facility]-ordered outpatient medications on file.       No Known Allergies    Family History   Pr organization: Not on file        Attends meetings of clubs or organizations: Not on file        Relationship status: Not on file      Intimate partner violence:        Fear of current or ex partner: Not on file        Emotionally abused: Not on file lb)    Height: 1.778 m (5' 10\")         Anesthesia Evaluation     Patient summary reviewed and Nursing notes reviewed    No history of anesthetic complications   Airway   Mallampati: I  TM distance: >3 FB  Neck ROM: full  Dental - normal exam     Comment:

## 2020-08-04 NOTE — ANESTHESIA POSTPROCEDURE EVALUATION
Patient: Rubina Diaz    Procedure Summary     Date:  08/04/20 Room / Location:  [facility] ENDOSCOPY 03 / [facility] END[facility]COPY    Anesthesia Start:  4352 Anesthesia Stop:  7967    Procedures:       COLONOSCOPY (N/A )      ESOPHAGOGASTRODUODENOSCOPY (EGD) (N/A ) Radha

## 2020-08-04 NOTE — H&P
History & Physical Examination    Patient Name: Rodolfo Perdomo  MRN: [id number]  CSN: [id number]  YOB: 1970    Diagnosis: colitis on last colonoscopy  History of Crain's esophagus      OMEPRAZOLE 40 MG Oral Capsule Delayed Release, TAKE Frequency: 4 or more times a week      Drinks per session: 5 or 6      Binge frequency: Monthly      Comment: 6 beers daily      SYSTEM Check if Review is Normal Check if Physical Exam is Normal If not normal, please explain:   HEENT [x ] [ x]    NECK & BA

## 2020-08-04 NOTE — OPERATIVE REPORT
[facility] HOSP - [facility] Endoscopy Report      Preoperative Diagnosis:  - history of colitis  - Crain's esophagus      Postoperative Diagnosis:  - internal hemorrhoids  - three tongues of short segment Crain's esophagus  - small hiatal hernia    Proc were at 40 cm and 42 cm for a 2 cm hiatal hernia. The stomach distended appropriately with insufflated air.   The mucosa of the stomach including cardia, fundus, gastric body and antrum were normal.  The duodenal bulb and post bulbar regions were normal.

## 2020-08-06 ENCOUNTER — MED REC SCAN ONLY (OUTPATIENT)
Dept: GASTROENTEROLOGY | Facility: CLINIC | Age: 50
End: 2020-08-06

## 2020-08-11 ENCOUNTER — OFFICE VISIT (OUTPATIENT)
Dept: INTERNAL MEDICINE CLINIC | Facility: CLINIC | Age: 50
End: 2020-08-11
Payer: COMMERCIAL

## 2020-08-11 VITALS
DIASTOLIC BLOOD PRESSURE: 61 MMHG | BODY MASS INDEX: 33.59 KG/M2 | HEART RATE: 61 BPM | TEMPERATURE: 97 F | WEIGHT: 234.63 LBS | SYSTOLIC BLOOD PRESSURE: 130 MMHG | HEIGHT: 70 IN

## 2020-08-11 DIAGNOSIS — R00.2 PALPITATIONS: Primary | ICD-10-CM

## 2020-08-11 DIAGNOSIS — E66.1 CLASS 1 DRUG-INDUCED OBESITY WITHOUT SERIOUS COMORBIDITY WITH BODY MASS INDEX (BMI) OF 33.0 TO 33.9 IN ADULT: ICD-10-CM

## 2020-08-11 DIAGNOSIS — K22.719 BARRETT'S ESOPHAGUS WITH DYSPLASIA: ICD-10-CM

## 2020-08-11 DIAGNOSIS — F10.10 ALCOHOL ABUSE: ICD-10-CM

## 2020-08-11 PROCEDURE — 3075F SYST BP GE 130 - 139MM HG: CPT | Performed by: INTERNAL MEDICINE

## 2020-08-11 PROCEDURE — 99214 OFFICE O/P EST MOD 30 MIN: CPT | Performed by: INTERNAL MEDICINE

## 2020-08-11 PROCEDURE — 3078F DIAST BP <80 MM HG: CPT | Performed by: INTERNAL MEDICINE

## 2020-08-11 PROCEDURE — 3008F BODY MASS INDEX DOCD: CPT | Performed by: INTERNAL MEDICINE

## 2020-08-11 NOTE — PROGRESS NOTES
Patient ID: Harl Cowden is a 52year old male. Patient presents with: Follow - Up: Pt states f/u labs and imaging done       HISTORY OF PRESENT ILLNESS:   HPI  Patient presents for above. Here for follow-up of multiple issues.     History of palpit at [facility] ENDOSCOPY   • ESOPHAGOGASTRODUODENOSCOPY (EGD) N/A 6/16/2017    Performed by Ankit Bell MD at [facility] ENDOSCOPY   • OTHER Right 1990    Shoulder surgery   • TONSILLECTOMY  Age 7         Current Outpatient Medications:   •  PEG [id number] organization: Not on file        Attends meetings of clubs or organizations: Not on file        Relationship status: Not on file      Intimate partner violence:        Fear of current or ex partner: Not on file        Emotionally abused: Not on file appointment tomorrow. · Lab work was essentially normal.    2. Alcohol abuse  · Congratulated him on quitting alcohol use. 3. Crain's esophagus with dysplasia  · Continue PPI. · Follow-up on biopsy results.     4. Class 1 drug-induced obesity without

## 2020-08-11 NOTE — PATIENT INSTRUCTIONS
Low-Salt Diet  This diet removes foods that are high in salt. It also limits the amount of salt you use when cooking. It is most often used for people with high blood pressure, edema (fluid retention), and kidney, liver, or heart disease.   Table salt conta Avoid: Flavored coffees, electrolyte replacement drinks, sports drinks  Meats  Ok: All fresh meat, fish, poultry, low-salt tuna, eggs, egg substitute  Avoid: Smoked, pickled, brine-cured, or salted meats and fish.  This includes tena, chipped beef, corned

## 2020-08-12 ENCOUNTER — OFFICE VISIT (OUTPATIENT)
Dept: CARDIOLOGY CLINIC | Facility: CLINIC | Age: 50
End: 2020-08-12
Payer: COMMERCIAL

## 2020-08-12 ENCOUNTER — TELEPHONE (OUTPATIENT)
Dept: CARDIOLOGY CLINIC | Facility: CLINIC | Age: 50
End: 2020-08-12

## 2020-08-12 VITALS
SYSTOLIC BLOOD PRESSURE: 128 MMHG | HEART RATE: 72 BPM | RESPIRATION RATE: 18 BRPM | BODY MASS INDEX: 33.21 KG/M2 | TEMPERATURE: 97 F | DIASTOLIC BLOOD PRESSURE: 82 MMHG | HEIGHT: 70 IN | WEIGHT: 232 LBS

## 2020-08-12 DIAGNOSIS — R07.82 INTERCOSTAL PAIN: ICD-10-CM

## 2020-08-12 DIAGNOSIS — Z82.49 FAMILY HISTORY OF PREMATURE CAD: ICD-10-CM

## 2020-08-12 DIAGNOSIS — R00.0 TACHYCARDIA: Primary | ICD-10-CM

## 2020-08-12 PROCEDURE — 99245 OFF/OP CONSLTJ NEW/EST HI 55: CPT | Performed by: INTERNAL MEDICINE

## 2020-08-12 PROCEDURE — 3074F SYST BP LT 130 MM HG: CPT | Performed by: INTERNAL MEDICINE

## 2020-08-12 PROCEDURE — 3008F BODY MASS INDEX DOCD: CPT | Performed by: INTERNAL MEDICINE

## 2020-08-12 PROCEDURE — 3079F DIAST BP 80-89 MM HG: CPT | Performed by: INTERNAL MEDICINE

## 2020-08-12 RX ORDER — METOPROLOL SUCCINATE 50 MG/1
50 TABLET, EXTENDED RELEASE ORAL ONCE
Qty: 1 TABLET | Refills: 0 | Status: SHIPPED | OUTPATIENT
Start: 2020-08-12 | End: 2020-08-12

## 2020-08-12 NOTE — PROGRESS NOTES
Cardiology Consult Note    8/12/2020    Ruma Lundberg is a 52year old male. HPI:   51-year-old male history of hyperlipidemia Crain's esophagus presents for initial visit.   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   EXAM:   /82   Pulse 72   Temp 9

## 2020-08-19 RX ORDER — SODIUM CHLORIDE, SODIUM LACTATE, POTASSIUM CHLORIDE, CALCIUM CHLORIDE 600; 310; 30; 20 MG/100ML; MG/100ML; MG/100ML; MG/100ML
INJECTION, SOLUTION INTRAVENOUS CONTINUOUS
Status: DISCONTINUED | OUTPATIENT
Start: 2020-08-19 | End: 2020-08-22

## 2020-08-19 RX ORDER — NITROGLYCERIN 0.4 MG/1
0.4 TABLET SUBLINGUAL ONCE
Status: COMPLETED | OUTPATIENT
Start: 2020-08-20 | End: 2020-08-20

## 2020-08-19 RX ORDER — METOPROLOL TARTRATE 5 MG/5ML
5 INJECTION INTRAVENOUS SEE ADMIN INSTRUCTIONS
Status: DISCONTINUED | OUTPATIENT
Start: 2020-08-20 | End: 2020-08-22

## 2020-08-19 RX ORDER — DILTIAZEM HYDROCHLORIDE 5 MG/ML
5 INJECTION INTRAVENOUS SEE ADMIN INSTRUCTIONS
Status: DISCONTINUED | OUTPATIENT
Start: 2020-08-20 | End: 2020-08-22

## 2020-08-19 RX ORDER — NALOXONE HYDROCHLORIDE 0.4 MG/ML
80 INJECTION, SOLUTION INTRAMUSCULAR; INTRAVENOUS; SUBCUTANEOUS AS NEEDED
Status: ACTIVE | OUTPATIENT
Start: 2020-08-19 | End: 2020-08-20

## 2020-08-20 ENCOUNTER — HOSPITAL ENCOUNTER (OUTPATIENT)
Dept: CT IMAGING | Facility: HOSPITAL | Age: 50
Discharge: HOME | End: 2020-08-20
Attending: INTERNAL MEDICINE
Payer: COMMERCIAL

## 2020-08-20 VITALS — BODY MASS INDEX: 32.93 KG/M2 | HEIGHT: 70 IN | WEIGHT: 230 LBS

## 2020-08-20 DIAGNOSIS — R07.82 INTERCOSTAL PAIN: ICD-10-CM

## 2020-08-20 DIAGNOSIS — Z82.49 FAMILY HISTORY OF PREMATURE CAD: ICD-10-CM

## 2020-08-20 LAB — CREAT BLD-MCNC: 1.1 MG/DL (ref 0.7–1.3)

## 2020-08-20 PROCEDURE — 75574 CT ANGIO HRT W/3D IMAGE: CPT | Performed by: INTERNAL MEDICINE

## 2020-08-20 PROCEDURE — 82565 ASSAY OF CREATININE: CPT

## 2020-08-20 RX ORDER — NITROGLYCERIN 0.4 MG/1
TABLET SUBLINGUAL
Status: DISPENSED
Start: 2020-08-20 | End: 2020-08-20

## 2020-08-20 RX ADMIN — NITROGLYCERIN 0.4 MG: 0.4 TABLET SUBLINGUAL at 08:23:00

## 2020-08-20 NOTE — IMAGING NOTE
TO RAD HOLDING AT 0745      HX TAKEN : family hx, no cp or sob    PT CONSENTED AT 0753     BASELINE VITAL SIGNS   HR 54  /91    CTA ORDERED BY DR Phoenix Dueñas WAS PT GIVEN CTA  PREMEDS NO, IF YES metoprolol dose as ordered, does not recall dose strength

## 2020-09-03 ENCOUNTER — TELEPHONE (OUTPATIENT)
Dept: GASTROENTEROLOGY | Facility: CLINIC | Age: 50
End: 2020-09-03

## 2020-09-03 NOTE — TELEPHONE ENCOUNTER
Entered into Epic:     Recall for _CLN___per _Lynch____in __10 years____  Last done: 8-4-2020  Next PQR:[id number]   updated   Result letter mailed. I wanted to get back to you with your colonoscopy and EGD results.   No colon polyps - repeat colonos

## 2020-09-03 NOTE — TELEPHONE ENCOUNTER
Entered into Epic:     Recall for _EGD___per _Lynch____in __3 years____  Last done: 8-4-2020  Next due:8-4-2023    Result letter mailed.       CLN recall entered in separate TE

## 2020-09-03 NOTE — TELEPHONE ENCOUNTER
----- Message from Nicole Purvis MD sent at 9/1/2020  6:59 PM CDT -----  I wanted to get back to you with your colonoscopy and EGD results. No colon polyps - repeat colonoscopy in 10 years time. Random samples taken negative for colitis.      The EGD sh

## 2021-01-25 NOTE — TELEPHONE ENCOUNTER
Requested Prescriptions     Pending Prescriptions Disp Refills   • OMEPRAZOLE 40 MG Oral Capsule Delayed Release [Pharmacy Med Name: OMEPRAZOLE 40MG CAPSULES] 90 capsule 3     Sig: TAKE ONE CAPSULE BY MOUTH DAILY     LOV  8/28/2018   Last seen CLN/EGD 8/04

## 2021-01-26 RX ORDER — OMEPRAZOLE 40 MG/1
CAPSULE, DELAYED RELEASE ORAL
Qty: 90 CAPSULE | Refills: 3 | Status: SHIPPED | OUTPATIENT
Start: 2021-01-26

## 2021-06-11 ENCOUNTER — OFFICE VISIT (OUTPATIENT)
Dept: INTERNAL MEDICINE CLINIC | Facility: CLINIC | Age: 51
End: 2021-06-11
Payer: COMMERCIAL

## 2021-06-11 VITALS
SYSTOLIC BLOOD PRESSURE: 138 MMHG | DIASTOLIC BLOOD PRESSURE: 80 MMHG | WEIGHT: 254 LBS | BODY MASS INDEX: 36.36 KG/M2 | HEIGHT: 70 IN | HEART RATE: 76 BPM

## 2021-06-11 DIAGNOSIS — Z00.00 PHYSICAL EXAM: ICD-10-CM

## 2021-06-11 DIAGNOSIS — K22.719 BARRETT'S ESOPHAGUS WITH DYSPLASIA: ICD-10-CM

## 2021-06-11 DIAGNOSIS — K21.00 GASTROESOPHAGEAL REFLUX DISEASE WITH ESOPHAGITIS WITHOUT HEMORRHAGE: Primary | ICD-10-CM

## 2021-06-11 PROCEDURE — 3079F DIAST BP 80-89 MM HG: CPT | Performed by: INTERNAL MEDICINE

## 2021-06-11 PROCEDURE — 3008F BODY MASS INDEX DOCD: CPT | Performed by: INTERNAL MEDICINE

## 2021-06-11 PROCEDURE — 99213 OFFICE O/P EST LOW 20 MIN: CPT | Performed by: INTERNAL MEDICINE

## 2021-06-11 PROCEDURE — 3075F SYST BP GE 130 - 139MM HG: CPT | Performed by: INTERNAL MEDICINE

## 2021-06-11 RX ORDER — SUCRALFATE 1 G/1
1 TABLET ORAL
Qty: 120 TABLET | Refills: 0 | Status: SHIPPED | OUTPATIENT
Start: 2021-06-11 | End: 2021-07-06

## 2021-06-11 NOTE — PROGRESS NOTES
HPI:    Patient ID: Kyara Hernández is a 48year old male.     HPI    Follow up  Would like blood work done     Schedule to see PCP Dr Sujata Gomez for physical exam    History or recent   Body aches  Skin hurting mild fever headache chills sweating  covid negati Medications   Medication Sig Dispense Refill   • OMEPRAZOLE 40 MG Oral Capsule Delayed Release TAKE ONE CAPSULE BY MOUTH DAILY 90 capsule 3   • acetaminophen 325 MG Oral Tab Take 2 tablets (650 mg total) by mouth every 6 (six) hours as needed.   0   • PEG 3 Pulmonary:      Effort: Pulmonary effort is normal.      Breath sounds: Normal breath sounds. Abdominal:      General: Bowel sounds are normal.      Palpations: Abdomen is soft. Skin:     General: Skin is warm and dry.    Neurological:      Mental Sta

## 2021-06-12 ENCOUNTER — LAB ENCOUNTER (OUTPATIENT)
Dept: LAB | Age: 51
End: 2021-06-12
Attending: INTERNAL MEDICINE
Payer: COMMERCIAL

## 2021-06-12 ENCOUNTER — HOSPITAL ENCOUNTER (OUTPATIENT)
Age: 51
Discharge: HOME | End: 2021-06-12
Attending: EMERGENCY MEDICINE
Payer: COMMERCIAL

## 2021-06-12 ENCOUNTER — APPOINTMENT (OUTPATIENT)
Dept: GENERAL RADIOLOGY | Age: 51
End: 2021-06-12
Attending: EMERGENCY MEDICINE
Payer: COMMERCIAL

## 2021-06-12 VITALS
OXYGEN SATURATION: 100 % | RESPIRATION RATE: 18 BRPM | SYSTOLIC BLOOD PRESSURE: 134 MMHG | DIASTOLIC BLOOD PRESSURE: 78 MMHG | HEART RATE: 56 BPM | TEMPERATURE: 97 F

## 2021-06-12 DIAGNOSIS — B34.9 VIRAL SYNDROME: Primary | ICD-10-CM

## 2021-06-12 DIAGNOSIS — Z00.00 PHYSICAL EXAM: ICD-10-CM

## 2021-06-12 PROCEDURE — 85025 COMPLETE CBC W/AUTO DIFF WBC: CPT | Performed by: EMERGENCY MEDICINE

## 2021-06-12 PROCEDURE — 84484 ASSAY OF TROPONIN QUANT: CPT

## 2021-06-12 PROCEDURE — 80053 COMPREHEN METABOLIC PANEL: CPT

## 2021-06-12 PROCEDURE — 93010 ELECTROCARDIOGRAM REPORT: CPT | Performed by: EMERGENCY MEDICINE

## 2021-06-12 PROCEDURE — 81015 MICROSCOPIC EXAM OF URINE: CPT

## 2021-06-12 PROCEDURE — 71046 X-RAY EXAM CHEST 2 VIEWS: CPT | Performed by: EMERGENCY MEDICINE

## 2021-06-12 PROCEDURE — 80061 LIPID PANEL: CPT

## 2021-06-12 PROCEDURE — 84439 ASSAY OF FREE THYROXINE: CPT

## 2021-06-12 PROCEDURE — 83036 HEMOGLOBIN GLYCOSYLATED A1C: CPT

## 2021-06-12 PROCEDURE — 99215 OFFICE O/P EST HI 40 MIN: CPT

## 2021-06-12 PROCEDURE — 93005 ELECTROCARDIOGRAM TRACING: CPT

## 2021-06-12 PROCEDURE — 99214 OFFICE O/P EST MOD 30 MIN: CPT

## 2021-06-12 PROCEDURE — 81002 URINALYSIS NONAUTO W/O SCOPE: CPT

## 2021-06-12 PROCEDURE — 80047 BASIC METABLC PNL IONIZED CA: CPT

## 2021-06-12 PROCEDURE — 87880 STREP A ASSAY W/OPTIC: CPT

## 2021-06-12 PROCEDURE — 84443 ASSAY THYROID STIM HORMONE: CPT

## 2021-06-12 PROCEDURE — 36415 COLL VENOUS BLD VENIPUNCTURE: CPT

## 2021-06-12 PROCEDURE — 85027 COMPLETE CBC AUTOMATED: CPT

## 2021-06-12 PROCEDURE — 93010 ELECTROCARDIOGRAM REPORT: CPT

## 2021-06-12 NOTE — ED INITIAL ASSESSMENT (HPI)
Has had low grade fever, body aches, diarrhea 1 week ago, had  Negative covid test Monday ,+ throat discomfort , here for testing prior to visiting mother

## 2021-06-12 NOTE — ED PROVIDER NOTES
Patient Seen in: [facility]      History   Patient presents with:  Testing    Stated Complaint: Covid test    HPI/Subjective:   HPI    The patient is a 51-year-old male with a past history of reflux who presents now with multiple medical comp 134/78   Pulse 56   Resp 18   Temp 97 °F (36.1 °C)   Temp src Temporal   SpO2 100 %   O2 Device None (Room air)       Current:/78   Pulse 56   Temp 97 °F (36.1 °C) (Temporal)   Resp 18   SpO2 100%         Physical Exam    Constitutional: Well-develop Viral syndrome versus Covid versus strep throat                             Disposition and Plan     Clinical Impression:  Viral syndrome  (primary encounter diagnosis)     Disposition:  Discharge  6/12/2021  9:25 am    Follow-up:  Jean-Paul Joyner MD  [address]

## 2021-06-14 PROBLEM — R00.0 TACHYCARDIA: Status: RESOLVED | Noted: 2020-08-12 | Resolved: 2021-06-14

## 2021-07-06 RX ORDER — SUCRALFATE 1 G/1
TABLET ORAL
Qty: 120 TABLET | Refills: 0 | Status: SHIPPED | OUTPATIENT
Start: 2021-07-06

## 2022-02-08 RX ORDER — OMEPRAZOLE 40 MG/1
40 CAPSULE, DELAYED RELEASE ORAL DAILY
Qty: 90 CAPSULE | Refills: 0 | Status: SHIPPED | OUTPATIENT
Start: 2022-02-08

## 2022-02-08 NOTE — TELEPHONE ENCOUNTER
Please advise on refill request below. Thank you. LV colonoscopy and EGD on 08/04/2020, LOV 08/28/2018    LR  01/26/2021 #90 with 3 refills    No future appointment in system    Rx pended if approved.

## 2022-02-09 NOTE — TELEPHONE ENCOUNTER
Spoke with patient and he is scheduled for a follow up on 4/12/2022 at 3:00pm ay [facility]

## 2022-02-10 RX ORDER — OMEPRAZOLE 40 MG/1
CAPSULE, DELAYED RELEASE ORAL
Qty: 90 CAPSULE | Refills: 3 | OUTPATIENT
Start: 2022-02-10

## 2022-04-05 ENCOUNTER — OFFICE VISIT (OUTPATIENT)
Dept: INTERNAL MEDICINE CLINIC | Facility: CLINIC | Age: 52
End: 2022-04-05
Payer: COMMERCIAL

## 2022-04-05 VITALS
SYSTOLIC BLOOD PRESSURE: 154 MMHG | HEIGHT: 70 IN | WEIGHT: 259 LBS | DIASTOLIC BLOOD PRESSURE: 92 MMHG | BODY MASS INDEX: 37.08 KG/M2 | HEART RATE: 76 BPM

## 2022-04-05 DIAGNOSIS — R00.2 PALPITATIONS: ICD-10-CM

## 2022-04-05 DIAGNOSIS — R21 PENILE RASH: ICD-10-CM

## 2022-04-05 DIAGNOSIS — R03.0 ELEVATED BLOOD PRESSURE READING: Primary | ICD-10-CM

## 2022-04-05 PROCEDURE — 99214 OFFICE O/P EST MOD 30 MIN: CPT | Performed by: INTERNAL MEDICINE

## 2022-04-05 PROCEDURE — 3077F SYST BP >= 140 MM HG: CPT | Performed by: INTERNAL MEDICINE

## 2022-04-05 PROCEDURE — 3080F DIAST BP >= 90 MM HG: CPT | Performed by: INTERNAL MEDICINE

## 2022-04-05 PROCEDURE — 3008F BODY MASS INDEX DOCD: CPT | Performed by: INTERNAL MEDICINE

## 2022-04-05 NOTE — PATIENT INSTRUCTIONS
1.  Put lotrimin cream on twice a day. 2.  Schedule Holter monitor. 3.  See me 1-2 days after Holter. 4.  Stop drinking alcohol.

## 2022-04-12 ENCOUNTER — OFFICE VISIT (OUTPATIENT)
Dept: GASTROENTEROLOGY | Facility: CLINIC | Age: 52
End: 2022-04-12
Payer: COMMERCIAL

## 2022-04-12 VITALS
SYSTOLIC BLOOD PRESSURE: 129 MMHG | DIASTOLIC BLOOD PRESSURE: 78 MMHG | HEART RATE: 56 BPM | BODY MASS INDEX: 38.2 KG/M2 | WEIGHT: 266.81 LBS | HEIGHT: 70 IN

## 2022-04-12 DIAGNOSIS — K22.719 BARRETT'S ESOPHAGUS WITH DYSPLASIA: ICD-10-CM

## 2022-04-12 DIAGNOSIS — K21.00 GASTROESOPHAGEAL REFLUX DISEASE WITH ESOPHAGITIS WITHOUT HEMORRHAGE: Primary | ICD-10-CM

## 2022-04-12 PROCEDURE — 3008F BODY MASS INDEX DOCD: CPT | Performed by: INTERNAL MEDICINE

## 2022-04-12 PROCEDURE — 99213 OFFICE O/P EST LOW 20 MIN: CPT | Performed by: INTERNAL MEDICINE

## 2022-04-12 PROCEDURE — 3074F SYST BP LT 130 MM HG: CPT | Performed by: INTERNAL MEDICINE

## 2022-04-12 PROCEDURE — 3078F DIAST BP <80 MM HG: CPT | Performed by: INTERNAL MEDICINE

## 2022-04-12 RX ORDER — OMEPRAZOLE 40 MG/1
40 CAPSULE, DELAYED RELEASE ORAL DAILY
Qty: 90 CAPSULE | Refills: 3 | Status: SHIPPED | OUTPATIENT
Start: 2022-04-12

## 2022-04-12 NOTE — PATIENT INSTRUCTIONS
GERD/hiatal hernia  Crain's esophagus  - repeat EGD in August 2023  - continue on omeprazole daily  - be careful with the ibuprofen    Sinus  - call if you need ENT referral    Repeat colonoscopy in August 2030

## 2022-04-13 ENCOUNTER — HOSPITAL ENCOUNTER (OUTPATIENT)
Dept: CV DIAGNOSTICS | Facility: HOSPITAL | Age: 52
Discharge: HOME | End: 2022-04-13
Attending: INTERNAL MEDICINE
Payer: COMMERCIAL

## 2022-04-13 DIAGNOSIS — R00.2 PALPITATIONS: ICD-10-CM

## 2022-04-13 PROCEDURE — 93225 XTRNL ECG REC<48 HRS REC: CPT | Performed by: INTERNAL MEDICINE

## 2022-04-20 ENCOUNTER — HOSPITAL ENCOUNTER (OUTPATIENT)
Dept: GENERAL RADIOLOGY | Age: 52
Discharge: HOME | End: 2022-04-20
Attending: INTERNAL MEDICINE
Payer: OTHER MISCELLANEOUS

## 2022-04-20 ENCOUNTER — OFFICE VISIT (OUTPATIENT)
Dept: INTERNAL MEDICINE CLINIC | Facility: CLINIC | Age: 52
End: 2022-04-20
Payer: COMMERCIAL

## 2022-04-20 ENCOUNTER — EKG ENCOUNTER (OUTPATIENT)
Dept: LAB | Age: 52
End: 2022-04-20
Attending: INTERNAL MEDICINE
Payer: OTHER MISCELLANEOUS

## 2022-04-20 ENCOUNTER — LAB ENCOUNTER (OUTPATIENT)
Dept: LAB | Age: 52
End: 2022-04-20
Attending: INTERNAL MEDICINE
Payer: OTHER MISCELLANEOUS

## 2022-04-20 VITALS
SYSTOLIC BLOOD PRESSURE: 122 MMHG | HEIGHT: 70 IN | BODY MASS INDEX: 38.22 KG/M2 | DIASTOLIC BLOOD PRESSURE: 79 MMHG | HEART RATE: 66 BPM | WEIGHT: 267 LBS

## 2022-04-20 DIAGNOSIS — S83.282S ACUTE LATERAL MENISCUS TEAR OF LEFT KNEE, SEQUELA: ICD-10-CM

## 2022-04-20 DIAGNOSIS — Z01.818 PREOP EXAM FOR INTERNAL MEDICINE: ICD-10-CM

## 2022-04-20 DIAGNOSIS — R00.2 PALPITATIONS: ICD-10-CM

## 2022-04-20 DIAGNOSIS — Z01.818 PREOP EXAM FOR INTERNAL MEDICINE: Primary | ICD-10-CM

## 2022-04-20 LAB
ALBUMIN SERPL-MCNC: 4.3 G/DL (ref 3.4–5)
ALBUMIN/GLOB SERPL: 1.3 {RATIO} (ref 1–2)
ALP LIVER SERPL-CCNC: 79 U/L
ALT SERPL-CCNC: 39 U/L
ANION GAP SERPL CALC-SCNC: 4 MMOL/L (ref 0–18)
AST SERPL-CCNC: 25 U/L (ref 15–37)
BASOPHILS # BLD AUTO: 0.05 X10(3) UL (ref 0–0.2)
BASOPHILS NFR BLD AUTO: 0.8 %
BILIRUB SERPL-MCNC: 0.8 MG/DL (ref 0.1–2)
BUN BLD-MCNC: 23 MG/DL (ref 7–18)
BUN/CREAT SERPL: 17.6 (ref 10–20)
CALCIUM BLD-MCNC: 10.1 MG/DL (ref 8.5–10.1)
CHLORIDE SERPL-SCNC: 104 MMOL/L (ref 98–112)
CO2 SERPL-SCNC: 29 MMOL/L (ref 21–32)
CREAT BLD-MCNC: 1.31 MG/DL
DEPRECATED RDW RBC AUTO: 38.6 FL (ref 35.1–46.3)
EOSINOPHIL # BLD AUTO: 0.3 X10(3) UL (ref 0–0.7)
EOSINOPHIL NFR BLD AUTO: 4.5 %
ERYTHROCYTE [DISTWIDTH] IN BLOOD BY AUTOMATED COUNT: 12.3 % (ref 11–15)
FASTING STATUS PATIENT QL REPORTED: NO
GLOBULIN PLAS-MCNC: 3.2 G/DL (ref 2.8–4.4)
GLUCOSE BLD-MCNC: 100 MG/DL (ref 70–99)
HCT VFR BLD AUTO: 47.8 %
HGB BLD-MCNC: 15.8 G/DL
IMM GRANULOCYTES # BLD AUTO: 0.02 X10(3) UL (ref 0–1)
IMM GRANULOCYTES NFR BLD: 0.3 %
LYMPHOCYTES # BLD AUTO: 2.02 X10(3) UL (ref 1–4)
LYMPHOCYTES NFR BLD AUTO: 30.4 %
MCH RBC QN AUTO: 28.4 PG (ref 26–34)
MCHC RBC AUTO-ENTMCNC: 33.1 G/DL (ref 31–37)
MCV RBC AUTO: 86 FL
MONOCYTES # BLD AUTO: 0.54 X10(3) UL (ref 0.1–1)
MONOCYTES NFR BLD AUTO: 8.1 %
NEUTROPHILS # BLD AUTO: 3.71 X10 (3) UL (ref 1.5–7.7)
NEUTROPHILS # BLD AUTO: 3.71 X10(3) UL (ref 1.5–7.7)
NEUTROPHILS NFR BLD AUTO: 55.9 %
OSMOLALITY SERPL CALC.SUM OF ELEC: 288 MOSM/KG (ref 275–295)
PLATELET # BLD AUTO: 190 10(3)UL (ref 150–450)
POTASSIUM SERPL-SCNC: 4.4 MMOL/L (ref 3.5–5.1)
PROT SERPL-MCNC: 7.5 G/DL (ref 6.4–8.2)
RBC # BLD AUTO: 5.56 X10(6)UL
SODIUM SERPL-SCNC: 137 MMOL/L (ref 136–145)
WBC # BLD AUTO: 6.6 X10(3) UL (ref 4–11)

## 2022-04-20 PROCEDURE — 85025 COMPLETE CBC W/AUTO DIFF WBC: CPT

## 2022-04-20 PROCEDURE — 3008F BODY MASS INDEX DOCD: CPT | Performed by: INTERNAL MEDICINE

## 2022-04-20 PROCEDURE — 3074F SYST BP LT 130 MM HG: CPT | Performed by: INTERNAL MEDICINE

## 2022-04-20 PROCEDURE — 71046 X-RAY EXAM CHEST 2 VIEWS: CPT | Performed by: INTERNAL MEDICINE

## 2022-04-20 PROCEDURE — 80053 COMPREHEN METABOLIC PANEL: CPT

## 2022-04-20 PROCEDURE — 99215 OFFICE O/P EST HI 40 MIN: CPT | Performed by: INTERNAL MEDICINE

## 2022-04-20 PROCEDURE — 3078F DIAST BP <80 MM HG: CPT | Performed by: INTERNAL MEDICINE

## 2022-04-20 PROCEDURE — 93005 ELECTROCARDIOGRAM TRACING: CPT

## 2022-04-20 PROCEDURE — 93010 ELECTROCARDIOGRAM REPORT: CPT | Performed by: INTERNAL MEDICINE

## 2022-04-20 PROCEDURE — 36415 COLL VENOUS BLD VENIPUNCTURE: CPT

## 2022-04-28 ENCOUNTER — MED REC SCAN ONLY (OUTPATIENT)
Dept: INTERNAL MEDICINE CLINIC | Facility: CLINIC | Age: 52
End: 2022-04-28

## 2022-06-17 ENCOUNTER — HOSPITAL ENCOUNTER (OUTPATIENT)
Dept: ULTRASOUND IMAGING | Facility: HOSPITAL | Age: 52
Discharge: HOME | End: 2022-06-17
Attending: ORTHOPAEDIC SURGERY
Payer: OTHER MISCELLANEOUS

## 2022-06-17 DIAGNOSIS — M67.362 TRANSIENT SYNOVITIS OF LEFT KNEE: ICD-10-CM

## 2022-06-17 DIAGNOSIS — S83.282D OTHER TEAR OF LATERAL MENISCUS, CURRENT INJURY, LEFT KNEE, SUBSEQUENT ENCOUNTER: ICD-10-CM

## 2022-06-17 PROCEDURE — 93970 EXTREMITY STUDY: CPT | Performed by: ORTHOPAEDIC SURGERY

## 2022-07-12 ENCOUNTER — MED REC SCAN ONLY (OUTPATIENT)
Dept: INTERNAL MEDICINE CLINIC | Facility: CLINIC | Age: 52
End: 2022-07-12

## 2022-08-23 ENCOUNTER — MED REC SCAN ONLY (OUTPATIENT)
Dept: INTERNAL MEDICINE CLINIC | Facility: CLINIC | Age: 52
End: 2022-08-23

## 2022-10-05 ENCOUNTER — MED REC SCAN ONLY (OUTPATIENT)
Dept: INTERNAL MEDICINE CLINIC | Facility: CLINIC | Age: 52
End: 2022-10-05

## 2022-10-25 ENCOUNTER — MED REC SCAN ONLY (OUTPATIENT)
Dept: INTERNAL MEDICINE CLINIC | Facility: CLINIC | Age: 52
End: 2022-10-25

## 2022-11-25 ENCOUNTER — HOSPITAL ENCOUNTER (OUTPATIENT)
Age: 52
Discharge: HOME | End: 2022-11-25
Attending: EMERGENCY MEDICINE
Payer: COMMERCIAL

## 2022-11-25 VITALS
TEMPERATURE: 98 F | SYSTOLIC BLOOD PRESSURE: 150 MMHG | RESPIRATION RATE: 20 BRPM | HEART RATE: 71 BPM | OXYGEN SATURATION: 97 % | DIASTOLIC BLOOD PRESSURE: 87 MMHG

## 2022-11-25 DIAGNOSIS — J11.1 INFLUENZA: Primary | ICD-10-CM

## 2022-11-25 LAB
POCT INFLUENZA A: POSITIVE
POCT INFLUENZA B: NEGATIVE

## 2022-11-25 PROCEDURE — 99212 OFFICE O/P EST SF 10 MIN: CPT

## 2022-11-25 PROCEDURE — 87502 INFLUENZA DNA AMP PROBE: CPT | Performed by: EMERGENCY MEDICINE

## 2022-11-25 RX ORDER — BENZONATATE 100 MG/1
100 CAPSULE ORAL 3 TIMES DAILY PRN
Qty: 30 CAPSULE | Refills: 0 | Status: SHIPPED | OUTPATIENT
Start: 2022-11-25 | End: 2022-12-25

## 2022-11-25 RX ORDER — ALBUTEROL SULFATE 90 UG/1
2 AEROSOL, METERED RESPIRATORY (INHALATION) EVERY 4 HOURS PRN
Qty: 18 G | Refills: 0 | Status: SHIPPED | OUTPATIENT
Start: 2022-11-25 | End: 2022-12-25

## 2022-11-25 NOTE — ED INITIAL ASSESSMENT (HPI)
Patient with congestion and sinus pressure x 1 month. On Tuesday developed chills. Fever of 101 on Wednesday. States cough is productive with green sputum.

## 2023-04-28 ENCOUNTER — HOSPITAL ENCOUNTER (OUTPATIENT)
Dept: GENERAL RADIOLOGY | Age: 53
Discharge: HOME | End: 2023-04-28
Attending: INTERNAL MEDICINE
Payer: COMMERCIAL

## 2023-04-28 ENCOUNTER — OFFICE VISIT (OUTPATIENT)
Dept: OTOLARYNGOLOGY | Facility: CLINIC | Age: 53
End: 2023-04-28

## 2023-04-28 ENCOUNTER — TELEPHONE (OUTPATIENT)
Dept: OTOLARYNGOLOGY | Facility: CLINIC | Age: 53
End: 2023-04-28

## 2023-04-28 ENCOUNTER — OFFICE VISIT (OUTPATIENT)
Dept: INTERNAL MEDICINE CLINIC | Facility: CLINIC | Age: 53
End: 2023-04-28

## 2023-04-28 VITALS
BODY MASS INDEX: 38.8 KG/M2 | SYSTOLIC BLOOD PRESSURE: 144 MMHG | HEART RATE: 70 BPM | WEIGHT: 271 LBS | OXYGEN SATURATION: 97 % | DIASTOLIC BLOOD PRESSURE: 90 MMHG | HEIGHT: 70 IN

## 2023-04-28 VITALS — HEIGHT: 70 IN | BODY MASS INDEX: 38.22 KG/M2 | WEIGHT: 267 LBS

## 2023-04-28 DIAGNOSIS — H69.90 EUSTACHIAN TUBE DISORDER, UNSPECIFIED LATERALITY: ICD-10-CM

## 2023-04-28 DIAGNOSIS — M25.562 ACUTE PAIN OF LEFT KNEE: ICD-10-CM

## 2023-04-28 DIAGNOSIS — R09.82 PND (POST-NASAL DRIP): ICD-10-CM

## 2023-04-28 DIAGNOSIS — J32.9 CHRONIC SINUSITIS, UNSPECIFIED LOCATION: Primary | ICD-10-CM

## 2023-04-28 DIAGNOSIS — R09.89 CHEST CONGESTION: ICD-10-CM

## 2023-04-28 DIAGNOSIS — R09.89 CHEST CONGESTION: Primary | ICD-10-CM

## 2023-04-28 DIAGNOSIS — J34.2 NASAL SEPTAL DEVIATION: ICD-10-CM

## 2023-04-28 PROCEDURE — 3080F DIAST BP >= 90 MM HG: CPT | Performed by: INTERNAL MEDICINE

## 2023-04-28 PROCEDURE — 99204 OFFICE O/P NEW MOD 45 MIN: CPT | Performed by: STUDENT IN AN ORGANIZED HEALTH CARE EDUCATION/TRAINING PROGRAM

## 2023-04-28 PROCEDURE — 99214 OFFICE O/P EST MOD 30 MIN: CPT | Performed by: INTERNAL MEDICINE

## 2023-04-28 PROCEDURE — 31575 DIAGNOSTIC LARYNGOSCOPY: CPT | Performed by: STUDENT IN AN ORGANIZED HEALTH CARE EDUCATION/TRAINING PROGRAM

## 2023-04-28 PROCEDURE — 3008F BODY MASS INDEX DOCD: CPT | Performed by: STUDENT IN AN ORGANIZED HEALTH CARE EDUCATION/TRAINING PROGRAM

## 2023-04-28 PROCEDURE — 3008F BODY MASS INDEX DOCD: CPT | Performed by: INTERNAL MEDICINE

## 2023-04-28 PROCEDURE — 71046 X-RAY EXAM CHEST 2 VIEWS: CPT | Performed by: INTERNAL MEDICINE

## 2023-04-28 PROCEDURE — 3077F SYST BP >= 140 MM HG: CPT | Performed by: INTERNAL MEDICINE

## 2023-04-28 RX ORDER — METHYLPREDNISOLONE 4 MG/1
TABLET ORAL
Qty: 21 TABLET | Refills: 0 | Status: SHIPPED | OUTPATIENT
Start: 2023-04-28

## 2023-04-28 RX ORDER — AMOXICILLIN AND CLAVULANATE POTASSIUM 875; 125 MG/1; MG/1
1 TABLET, FILM COATED ORAL EVERY 12 HOURS
Qty: 28 TABLET | Refills: 0 | Status: SHIPPED | OUTPATIENT
Start: 2023-04-28 | End: 2023-05-12

## 2023-04-28 RX ORDER — AMOXICILLIN AND CLAVULANATE POTASSIUM 875; 125 MG/1; MG/1
1 TABLET, FILM COATED ORAL EVERY 12 HOURS
Qty: 20 TABLET | Refills: 0 | Status: SHIPPED | OUTPATIENT
Start: 2023-04-28 | End: 2023-04-28

## 2023-04-28 RX ORDER — AZELASTINE 1 MG/ML
2 SPRAY, METERED NASAL 2 TIMES DAILY
Qty: 30 ML | Refills: 0 | Status: SHIPPED | OUTPATIENT
Start: 2023-04-28

## 2023-05-02 RX ORDER — AZELASTINE 1 MG/ML
SPRAY, METERED NASAL
Qty: 3 EACH | Refills: 0 | Status: SHIPPED | OUTPATIENT
Start: 2023-05-02

## 2023-05-31 ENCOUNTER — TELEPHONE (OUTPATIENT)
Facility: CLINIC | Age: 53
End: 2023-05-31

## 2023-06-07 ENCOUNTER — TELEPHONE (OUTPATIENT)
Facility: CLINIC | Age: 53
End: 2023-06-07

## 2023-06-07 DIAGNOSIS — K22.719 BARRETT'S ESOPHAGUS WITH DYSPLASIA: Primary | ICD-10-CM

## 2023-06-07 RX ORDER — IBUPROFEN 200 MG
200 TABLET ORAL EVERY 6 HOURS PRN
COMMUNITY

## 2023-06-07 RX ORDER — ACETAMINOPHEN 325 MG/1
325 TABLET ORAL EVERY 6 HOURS PRN
COMMUNITY

## 2023-06-07 NOTE — TELEPHONE ENCOUNTER
Dr. Ingrid Arceo    Patient called to schedule 3 year recall EGD, please provide orders if ok to schedule directly. Thank you    Last Procedure, Date, MD:  Dr. Ingrid Arceo EGD and colonoscopy 8/4/2020  Last Diagnosis:  Internal hemorrhoids, three tongues of short segment Rcain's esophagus, small hiatal hernia  Recalled (mth/yrs): 3 years for EGD  Sedation Used Previously:  MAC    Anticoagulants: no  Diuretics:  no  Diabetic Med's (PO/Injectables): no  Weight loss Med's: no  Iron/Herbal/Multivitamin Supplements (RX/OTC): no  Marijuana/Vaping/CBD: no  Height & Weight: 5'10\" 271 lbs  BMI: 38.88  Hx of Cardiac/CVA Issues/(MI/Stroke): no  Devices Pacemaker/Defibrillator/Stents: no  Respiratory Issues/Oxygen Use/BRAYAN/COPD: no  Issues w/ Anesthesia: no    Symptoms (Y/N): yes, reports some GERD/indigestion, told me he saw you for this and has history of Barretts   Symptoms Details: n/a    Special Comments/Notes:    Please advise on orders and prep. Thank you!

## 2023-06-08 NOTE — TELEPHONE ENCOUNTER
Scheduled for:  Janiya Garza  Provider Name:  Dr. Dayana Solorio   Date:  10/12/2023   Location:  [facility]  Sedation:  mac  Time:  11:30am (pt is aware to arrive at 10:30am    Prep:  Npo  Meds/Allergies Reconciled?:  yes    Diagnosis with codes:  Crain's K22.70  Was patient informed to call insurance with codes (Y/N):  yes     Referral sent?:  Referral was sent at the time of electronic surgical scheduling. [facility] or [address] notified?:  I sent an electronic request to Endo Scheduling and received a confirmation today. Medication Orders:  none  Misc Orders:  none     Further instructions given by staff:  I discussed the prep instructions with the patient which ** verbally understood and is aware that I will mail the instructions today.

## 2023-07-27 NOTE — TELEPHONE ENCOUNTER
Requested Prescriptions     Pending Prescriptions Disp Refills    OMEPRAZOLE 40 MG Oral Capsule Delayed Release [Pharmacy Med Name: OMEPRAZOLE 40MG CAPSULES] 90 capsule 3     Sig: TAKE 1 CAPSULE(40 MG) BY MOUTH DAILY     LOV  4/12/22      LR 4/12/2022    EGD scheduled for 10/12/2023      [place]

## 2023-07-28 RX ORDER — OMEPRAZOLE 40 MG/1
40 CAPSULE, DELAYED RELEASE ORAL DAILY
Qty: 90 CAPSULE | Refills: 3 | Status: SHIPPED | OUTPATIENT
Start: 2023-07-28

## 2023-08-07 ENCOUNTER — HOSPITAL ENCOUNTER (OUTPATIENT)
Age: 53
Discharge: HOME | End: 2023-08-07
Attending: EMERGENCY MEDICINE
Payer: COMMERCIAL

## 2023-08-07 ENCOUNTER — APPOINTMENT (OUTPATIENT)
Dept: ULTRASOUND IMAGING | Age: 53
End: 2023-08-07
Attending: EMERGENCY MEDICINE
Payer: COMMERCIAL

## 2023-08-07 VITALS
TEMPERATURE: 98 F | HEART RATE: 81 BPM | SYSTOLIC BLOOD PRESSURE: 133 MMHG | DIASTOLIC BLOOD PRESSURE: 83 MMHG | RESPIRATION RATE: 18 BRPM | OXYGEN SATURATION: 98 %

## 2023-08-07 DIAGNOSIS — M79.661 RIGHT CALF PAIN: Primary | ICD-10-CM

## 2023-08-07 DIAGNOSIS — M66.0 RUPTURED BAKERS CYST: ICD-10-CM

## 2023-08-07 PROCEDURE — 93971 EXTREMITY STUDY: CPT | Performed by: EMERGENCY MEDICINE

## 2023-08-07 PROCEDURE — 99213 OFFICE O/P EST LOW 20 MIN: CPT

## 2023-08-07 PROCEDURE — 99214 OFFICE O/P EST MOD 30 MIN: CPT

## 2023-08-07 NOTE — ED INITIAL ASSESSMENT (HPI)
Pt presents with RLE swelling and right calf pain x 2-3 days. Pt reports 1.5 hour commute to and from work each day. No long air travel. Pt reports mild increased SOB over past \"couple of months\". No acute SOB. Pt reports increase in water intake due to \"the heat and extreme sweating\". Pt reports drinking 12-14 bottles of water a day and sometimes drinks Pedialyte as well.

## 2023-10-12 ENCOUNTER — ANESTHESIA EVENT (OUTPATIENT)
Dept: ENDOSCOPY | Facility: HOSPITAL | Age: 53
End: 2023-10-12
Payer: COMMERCIAL

## 2023-10-12 ENCOUNTER — HOSPITAL ENCOUNTER (OUTPATIENT)
Facility: HOSPITAL | Age: 53
Setting detail: HOSPITAL OUTPATIENT SURGERY
Discharge: HOME | End: 2023-10-12
Attending: INTERNAL MEDICINE | Admitting: INTERNAL MEDICINE
Payer: COMMERCIAL

## 2023-10-12 ENCOUNTER — ANESTHESIA (OUTPATIENT)
Dept: ENDOSCOPY | Facility: HOSPITAL | Age: 53
End: 2023-10-12
Payer: COMMERCIAL

## 2023-10-12 VITALS
DIASTOLIC BLOOD PRESSURE: 97 MMHG | SYSTOLIC BLOOD PRESSURE: 138 MMHG | BODY MASS INDEX: 35.79 KG/M2 | WEIGHT: 250 LBS | OXYGEN SATURATION: 96 % | TEMPERATURE: 98 F | HEART RATE: 55 BPM | HEIGHT: 70 IN | RESPIRATION RATE: 19 BRPM

## 2023-10-12 DIAGNOSIS — K22.719 BARRETT'S ESOPHAGUS WITH DYSPLASIA: ICD-10-CM

## 2023-10-12 PROCEDURE — 43239 EGD BIOPSY SINGLE/MULTIPLE: CPT | Performed by: INTERNAL MEDICINE

## 2023-10-12 PROCEDURE — 0DB48ZX EXCISION OF ESOPHAGOGASTRIC JUNCTION, VIA NATURAL OR ARTIFICIAL OPENING ENDOSCOPIC, DIAGNOSTIC: ICD-10-PCS | Performed by: INTERNAL MEDICINE

## 2023-10-12 RX ORDER — SODIUM CHLORIDE, SODIUM LACTATE, POTASSIUM CHLORIDE, CALCIUM CHLORIDE 600; 310; 30; 20 MG/100ML; MG/100ML; MG/100ML; MG/100ML
INJECTION, SOLUTION INTRAVENOUS CONTINUOUS
Status: DISCONTINUED | OUTPATIENT
Start: 2023-10-12 | End: 2023-10-12

## 2023-10-12 RX ORDER — LIDOCAINE HYDROCHLORIDE 10 MG/ML
INJECTION, SOLUTION EPIDURAL; INFILTRATION; INTRACAUDAL; PERINEURAL AS NEEDED
Status: DISCONTINUED | OUTPATIENT
Start: 2023-10-12 | End: 2023-10-12 | Stop reason: SURG

## 2023-10-12 RX ADMIN — LIDOCAINE HYDROCHLORIDE 100 MG: 10 INJECTION, SOLUTION EPIDURAL; INFILTRATION; INTRACAUDAL; PERINEURAL at 11:29:00

## 2023-10-12 NOTE — DISCHARGE INSTRUCTIONS
Home Care Instructions for Gastroscopy with Sedation    Diet:  - Resume your regular diet as tolerated unless otherwise instructed. - Start with light meals to minimize bloating.  - Do not drink alcohol today. Medication:  - If you have questions about resuming your normal medications, please contact your Primary Care Physician. Activities:  - Take it easy today. Do not return to work today. - Do not drive today. - Do not operate any machinery today (including kitchen equipment). Gastroscopy:  - You may have a sore throat for 2-3 days following the exam. This is normal. Gargling with warm salt water (1/2 tsp salt to 1 glass warm water) or using throat lozenges will help. - If you experience any sharp pain in your neck, abdomen or chest, vomiting of blood, oral temperature over 100 degrees Fahrenheit, light-headedness or dizziness, or any other problems, contact your doctor. **If unable to reach your doctor, please go to the [facility] Emergency Room**    - Your referring physician will receive a full report of your examination.  - If you do not hear from your doctor's office within two weeks of your biopsy, please call them for your results. You may be able to see your laboratory results in [facility] between 4 and 7 business days. In some cases, your physician may not have viewed the results before they are released to [address] If you have questions regarding your results contact the physician who ordered the test/exam by phone or via [address] by choosing \"Ask a Medical Question. \"

## 2023-10-12 NOTE — H&P
History & Physical Examination    Patient Name: Lord Beckman  MRN: [id number]  CSN: [id number]  YOB: 1970    Diagnosis:     Barretts esophagus      Omeprazole 40 MG Oral Capsule Delayed Release, Take 1 capsule (40 mg total) by mouth daily. , Disp: 90 capsule, Rfl: 3, 10/11/2023  ibuprofen 200 MG Oral Tab, Take 1 tablet (200 mg total) by mouth every 6 (six) hours as needed for Pain. Takes as needed for pain, Disp: , Rfl: , PRN  acetaminophen 325 MG Oral Tab, Take 1 tablet (325 mg total) by mouth every 6 (six) hours as needed for Pain. Takes only as needed, Disp: , Rfl: , PRN  AZELASTINE 0.1 % Nasal Solution, USE 2 SPRAYS IN EACH NOSTRIL TWICE DAILY (Patient not taking: Reported on 2023), Disp: 3 each, Rfl: 0  methylPREDNISolone 4 MG Oral Tablet Therapy Pack, Take by oral route. (Patient not taking: Reported on 2023), Disp: 21 tablet, Rfl: 0  loratadine-pseudoephedrine ER  MG Oral Tablet 24 Hr, Take 1 tablet by mouth at bedtime.  (Patient not taking: Reported on 2023), Disp: 30 tablet, Rfl: 1      lactated ringers infusion, , Intravenous, Continuous        Allergies: No Known Allergies    Past Medical History:   Diagnosis Date    Crain's esophagus     GERD (gastroesophageal reflux disease)     Hypercholesterolemia      Past Surgical History:   Procedure Laterality Date    CARPAL TUNNEL RELEASE Bilateral     COLONOSCOPY N/A 2020    Procedure: COLONOSCOPY;  Surgeon: Mary Keyes MD;  Location: [facility] ENDOSCOPY    ELBOW SURGERY Right     Ulnar nerve    OTHER Right     Shoulder surgery    TONSILLECTOMY  Age 7     Family History   Problem Relation Age of Onset    Heart Disease Father         Details unclear    Diabetes Father     Other (COPD) Father     Lung Disorder Mother     Diabetes Sister     Other (Brain Cancer) Maternal Grandmother     Heart Disease Maternal Grandfather          MI age 47    Heart Disease Paternal [de-identified]          MI age 61 Other (Brain Cancer) Paternal Grandfather      Social History    Tobacco Use      Smoking status: Former        Packs/day: 2.00        Years: 14.00        Additional pack years: 0.00        Total pack years: 28.00        Types: Cigarettes        Quit date: 2010        Years since quittin.0      Smokeless tobacco: Never    Alcohol use: Yes      Comment: 6 beers daily      SYSTEM Check if Review is Normal Check if Physical Exam is Normal If not normal, please explain:   HEENT [x ] [ x]    NECK & BACK [x ] [x ]    HEART [x ] [ x]    LUNGS [x ] [ x]    ABDOMEN [x ] [x ]    UROGENITAL [ ] [ ]    EXTREMITIES [x ] [x ]    OTHER        [ x ] I have discussed the risks and benefits and alternatives with the patient/family. They understand and agree to proceed with plan of care. [ x ] I have reviewed the History and Physical done within the last 30 days. Any changes noted above. Dianna Ramos.  Margoth Mata MD  10/12/2023  11:17 AM

## 2023-10-12 NOTE — OPERATIVE REPORT
[facility] Endoscopy Report  Date of procedure-October 12, 2020. Preoperative Diagnosis:  - refractory GERD  - history of Crain's esophagus        Postoperative Diagnosis:  - small hiatal hernia  - two tongues of Crain's like mucosa, short segment        Procedure:    Esophagogastroduodenoscopy         Surgeon:  Eleonora Taveras M.D. Anesthesia:  MAC sedation, see anesthesia records. Technique:  After informed consent, the patient was placed in the left lateral recumbent position. An Olympus adult HD gastroscope was inserted into the hypopharynx and advanced under direct vision into the esophagus, stomach and duodenum. The endoscope was withdrawn to the stomach where retroflexion of the annulus, body, cardia and fundus was performed. The instrument was straightened, insufflated air and fluid were suctioned and the endoscope was withdrawn. The procedure was well tolerated without immediate complication. Findings: The esophagus showed an irregular Z-line with two tongues of salmon colored mucosa each only extending up 1 cm from the GE junction, biopsies taken from this region and also mid-esophagus. The GE junction and diaphragmatic impression were at 39 cm and 41 cm for a small 2 cm hiatal hernia. The stomach distended appropriately with insufflated air. The mucosa of the stomach including cardia, fundus, gastric body and antrum were normal.  The duodenal bulb and post bulbar regions were normal.     Estimated blood loss-insignificant  Specimens-see above    Impression:  - small hiatal hernia  - two tongues of Crain's like mucosa, short segment     Recommendations:  - Post procedure instructions given  - Follow up on biopsy results, repeat EGD in 2 years  - Continue on omeprazole   - Further workup if symptoms continue              Mary Gross.  Yasmany Chavez MD  10/12/2023  11:40 AM

## 2023-10-17 ENCOUNTER — TELEPHONE (OUTPATIENT)
Dept: GASTROENTEROLOGY | Facility: CLINIC | Age: 53
End: 2023-10-17

## 2023-10-17 NOTE — TELEPHONE ENCOUNTER
Patient contacted, verified and results from DR. Andrade given. Specialty comments updated. 2 year EGD recall placed in patient outreach. Next due on 10/12/2025 per Dr. Sharon Velasquez.

## 2023-10-17 NOTE — TELEPHONE ENCOUNTER
----- Message from Caleb Barriga MD sent at 10/12/2023  5:43 PM CDT -----  Lynnette Antonio,   Your upper endoscopy today showed the Crain's areas, samples were negative for dysplasia. This is good news. There was a little more irritation likely related from acid reflux and I have advised that you stay on the medication daily. We will need to repeat the upper endoscopy in 2 years again.     Dr. Rehana Beckford

## 2023-12-11 ENCOUNTER — HOSPITAL ENCOUNTER (OUTPATIENT)
Dept: GENERAL RADIOLOGY | Age: 53
Discharge: HOME | End: 2023-12-11
Attending: ORTHOPAEDIC SURGERY
Payer: COMMERCIAL

## 2023-12-11 ENCOUNTER — OFFICE VISIT (OUTPATIENT)
Dept: ORTHOPEDICS CLINIC | Facility: CLINIC | Age: 53
End: 2023-12-11
Payer: COMMERCIAL

## 2023-12-11 ENCOUNTER — TELEPHONE (OUTPATIENT)
Dept: ORTHOPEDICS CLINIC | Facility: CLINIC | Age: 53
End: 2023-12-11

## 2023-12-11 VITALS — HEIGHT: 70 IN | BODY MASS INDEX: 35.79 KG/M2 | WEIGHT: 250 LBS

## 2023-12-11 DIAGNOSIS — Z01.89 ENCOUNTER FOR LOWER EXTREMITY COMPARISON IMAGING STUDY: ICD-10-CM

## 2023-12-11 DIAGNOSIS — M17.12 PRIMARY OSTEOARTHRITIS OF LEFT KNEE: Primary | ICD-10-CM

## 2023-12-11 DIAGNOSIS — M25.562 LEFT KNEE PAIN, UNSPECIFIED CHRONICITY: Primary | ICD-10-CM

## 2023-12-11 DIAGNOSIS — M25.562 LEFT KNEE PAIN, UNSPECIFIED CHRONICITY: ICD-10-CM

## 2023-12-11 PROCEDURE — 73564 X-RAY EXAM KNEE 4 OR MORE: CPT | Performed by: ORTHOPAEDIC SURGERY

## 2023-12-11 PROCEDURE — 73562 X-RAY EXAM OF KNEE 3: CPT | Performed by: ORTHOPAEDIC SURGERY

## 2023-12-11 NOTE — TELEPHONE ENCOUNTER
Called patient to inquire about which knee (if not both) is causing them pain for their appt today with Dr. Estrada Noble, in order to pace the proper imaging needed. LVM for patient to call the office back to let us know prior to their appt.

## 2024-03-01 ENCOUNTER — TELEPHONE (OUTPATIENT)
Dept: INTERNAL MEDICINE CLINIC | Facility: CLINIC | Age: 54
End: 2024-03-01

## 2024-03-01 DIAGNOSIS — Z82.49 FAMILY HISTORY OF PREMATURE CAD: ICD-10-CM

## 2024-03-01 DIAGNOSIS — E78.00 HYPERCHOLESTEROLEMIA: ICD-10-CM

## 2024-03-01 DIAGNOSIS — Z00.00 ANNUAL PHYSICAL EXAM: Primary | ICD-10-CM

## 2024-03-01 NOTE — TELEPHONE ENCOUNTER
Patient called and is requesting labs and urine sample prior to his physical on 3/19   [facility]

## 2024-03-04 NOTE — TELEPHONE ENCOUNTER
Pended UA routine please sign if appropriate      I will notify patient once UA signed      Patient also requested Urine sample lab

## 2024-03-08 ENCOUNTER — LAB ENCOUNTER (OUTPATIENT)
Dept: LAB | Age: 54
End: 2024-03-08
Attending: INTERNAL MEDICINE
Payer: COMMERCIAL

## 2024-03-08 DIAGNOSIS — Z00.00 ANNUAL PHYSICAL EXAM: ICD-10-CM

## 2024-03-08 DIAGNOSIS — E78.00 HYPERCHOLESTEROLEMIA: ICD-10-CM

## 2024-03-08 DIAGNOSIS — Z82.49 FAMILY HISTORY OF PREMATURE CAD: ICD-10-CM

## 2024-03-08 LAB
ALBUMIN SERPL-MCNC: 4.5 G/DL (ref 3.2–4.8)
ALBUMIN/GLOB SERPL: 1.6 {RATIO} (ref 1–2)
ALP LIVER SERPL-CCNC: 82 U/L
ALT SERPL-CCNC: 24 U/L
ANION GAP SERPL CALC-SCNC: 7 MMOL/L (ref 0–18)
AST SERPL-CCNC: 24 U/L (ref ?–34)
BASOPHILS # BLD AUTO: 0.04 X10(3) UL (ref 0–0.2)
BASOPHILS NFR BLD AUTO: 0.7 %
BILIRUB SERPL-MCNC: 0.7 MG/DL (ref 0.3–1.2)
BILIRUB UR QL: NEGATIVE
BUN BLD-MCNC: 12 MG/DL (ref 9–23)
BUN/CREAT SERPL: 11.4 (ref 10–20)
CALCIUM BLD-MCNC: 10 MG/DL (ref 8.7–10.4)
CHLORIDE SERPL-SCNC: 108 MMOL/L (ref 98–112)
CHOLEST SERPL-MCNC: 170 MG/DL (ref ?–200)
CLARITY UR: CLEAR
CO2 SERPL-SCNC: 27 MMOL/L (ref 21–32)
CREAT BLD-MCNC: 1.05 MG/DL
DEPRECATED RDW RBC AUTO: 37.8 FL (ref 35.1–46.3)
EGFRCR SERPLBLD CKD-EPI 2021: 85 ML/MIN/1.73M2 (ref 60–?)
EOSINOPHIL # BLD AUTO: 0.32 X10(3) UL (ref 0–0.7)
EOSINOPHIL NFR BLD AUTO: 5.9 %
ERYTHROCYTE [DISTWIDTH] IN BLOOD BY AUTOMATED COUNT: 12.3 % (ref 11–15)
FASTING PATIENT LIPID ANSWER: YES
FASTING STATUS PATIENT QL REPORTED: YES
GLOBULIN PLAS-MCNC: 2.8 G/DL (ref 2.8–4.4)
GLUCOSE BLD-MCNC: 89 MG/DL (ref 70–99)
GLUCOSE UR-MCNC: NORMAL MG/DL
HCT VFR BLD AUTO: 43.5 %
HDLC SERPL-MCNC: 46 MG/DL (ref 40–59)
HGB BLD-MCNC: 15.3 G/DL
HGB UR QL STRIP.AUTO: NEGATIVE
IMM GRANULOCYTES # BLD AUTO: 0.01 X10(3) UL (ref 0–1)
IMM GRANULOCYTES NFR BLD: 0.2 %
KETONES UR-MCNC: NEGATIVE MG/DL
LDLC SERPL CALC-MCNC: 98 MG/DL (ref ?–100)
LEUKOCYTE ESTERASE UR QL STRIP.AUTO: NEGATIVE
LYMPHOCYTES # BLD AUTO: 2.24 X10(3) UL (ref 1–4)
LYMPHOCYTES NFR BLD AUTO: 41 %
MCH RBC QN AUTO: 29.4 PG (ref 26–34)
MCHC RBC AUTO-ENTMCNC: 35.2 G/DL (ref 31–37)
MCV RBC AUTO: 83.5 FL
MONOCYTES # BLD AUTO: 0.39 X10(3) UL (ref 0.1–1)
MONOCYTES NFR BLD AUTO: 7.1 %
NEUTROPHILS # BLD AUTO: 2.47 X10 (3) UL (ref 1.5–7.7)
NEUTROPHILS # BLD AUTO: 2.47 X10(3) UL (ref 1.5–7.7)
NEUTROPHILS NFR BLD AUTO: 45.1 %
NITRITE UR QL STRIP.AUTO: NEGATIVE
NONHDLC SERPL-MCNC: 124 MG/DL (ref ?–130)
OSMOLALITY SERPL CALC.SUM OF ELEC: 293 MOSM/KG (ref 275–295)
PH UR: 6 [PH] (ref 5–8)
PLATELET # BLD AUTO: 207 10(3)UL (ref 150–450)
POTASSIUM SERPL-SCNC: 4.3 MMOL/L (ref 3.5–5.1)
PROT SERPL-MCNC: 7.3 G/DL (ref 5.7–8.2)
PROT UR-MCNC: NEGATIVE MG/DL
PSA SERPL-MCNC: 0.61 NG/ML (ref ?–4)
RBC # BLD AUTO: 5.21 X10(6)UL
SODIUM SERPL-SCNC: 142 MMOL/L (ref 136–145)
SP GR UR STRIP: 1.02 (ref 1–1.03)
TRIGL SERPL-MCNC: 147 MG/DL (ref 30–149)
TSI SER-ACNC: 1.13 MIU/ML (ref 0.55–4.78)
UROBILINOGEN UR STRIP-ACNC: NORMAL
VLDLC SERPL CALC-MCNC: 24 MG/DL (ref 0–30)
WBC # BLD AUTO: 5.5 X10(3) UL (ref 4–11)

## 2024-03-08 PROCEDURE — 84443 ASSAY THYROID STIM HORMONE: CPT

## 2024-03-08 PROCEDURE — 85025 COMPLETE CBC W/AUTO DIFF WBC: CPT

## 2024-03-08 PROCEDURE — 80061 LIPID PANEL: CPT

## 2024-03-08 PROCEDURE — 84153 ASSAY OF PSA TOTAL: CPT

## 2024-03-08 PROCEDURE — 81003 URINALYSIS AUTO W/O SCOPE: CPT | Performed by: INTERNAL MEDICINE

## 2024-03-08 PROCEDURE — 36415 COLL VENOUS BLD VENIPUNCTURE: CPT

## 2024-03-08 PROCEDURE — 80053 COMPREHEN METABOLIC PANEL: CPT

## 2024-03-19 ENCOUNTER — OFFICE VISIT (OUTPATIENT)
Dept: INTERNAL MEDICINE CLINIC | Facility: CLINIC | Age: 54
End: 2024-03-19

## 2024-03-19 VITALS
SYSTOLIC BLOOD PRESSURE: 142 MMHG | RESPIRATION RATE: 18 BRPM | HEART RATE: 84 BPM | OXYGEN SATURATION: 96 % | HEIGHT: 70 IN | BODY MASS INDEX: 36.22 KG/M2 | WEIGHT: 253 LBS | DIASTOLIC BLOOD PRESSURE: 84 MMHG | TEMPERATURE: 98 F

## 2024-03-19 DIAGNOSIS — F10.10 ALCOHOL ABUSE: ICD-10-CM

## 2024-03-19 DIAGNOSIS — E66.1 CLASS 1 DRUG-INDUCED OBESITY WITHOUT SERIOUS COMORBIDITY WITH BODY MASS INDEX (BMI) OF 33.0 TO 33.9 IN ADULT: ICD-10-CM

## 2024-03-19 DIAGNOSIS — E78.00 HYPERCHOLESTEROLEMIA: ICD-10-CM

## 2024-03-19 DIAGNOSIS — Z82.49 FAMILY HISTORY OF PREMATURE CAD: ICD-10-CM

## 2024-03-19 DIAGNOSIS — Z00.00 ANNUAL PHYSICAL EXAM: Primary | ICD-10-CM

## 2024-03-19 DIAGNOSIS — Z12.11 COLON CANCER SCREENING: ICD-10-CM

## 2024-03-19 DIAGNOSIS — K21.00 GASTROESOPHAGEAL REFLUX DISEASE WITH ESOPHAGITIS WITHOUT HEMORRHAGE: ICD-10-CM

## 2024-03-19 DIAGNOSIS — K22.719 BARRETT'S ESOPHAGUS WITH DYSPLASIA: ICD-10-CM

## 2024-03-19 PROBLEM — I20.0 UNSTABLE ANGINA (HCC): Status: RESOLVED | Noted: 2018-02-04 | Resolved: 2024-03-19

## 2024-03-19 PROCEDURE — 3079F DIAST BP 80-89 MM HG: CPT | Performed by: INTERNAL MEDICINE

## 2024-03-19 PROCEDURE — 99213 OFFICE O/P EST LOW 20 MIN: CPT | Performed by: INTERNAL MEDICINE

## 2024-03-19 PROCEDURE — 99396 PREV VISIT EST AGE 40-64: CPT | Performed by: INTERNAL MEDICINE

## 2024-03-19 PROCEDURE — 3008F BODY MASS INDEX DOCD: CPT | Performed by: INTERNAL MEDICINE

## 2024-03-19 PROCEDURE — 3077F SYST BP >= 140 MM HG: CPT | Performed by: INTERNAL MEDICINE

## 2024-03-19 NOTE — PROGRESS NOTES
Patient ID: Lj De La Rosa is a 53 year old male.  Chief Complaint   Patient presents with    Physical        HISTORY OF PRESENT ILLNESS:   HPI  Patient presents for above.  Here for complete physical.  Did have his labs done prior to visit.    History of hypercholesterolemia not on any medications.  Does have a family history of premature cardiac disease in his father who had a bypass surgery at the age of 44.  Father did have multiple risk factors and did pass away in his 60s.  Patient's weight has vacillated.  Activity levels have also vacillated.    History of alcohol abuse.  At times he does drink 6 beers nightly.  He does have known Crain's esophagus for which she does receive an EGD every 2 years with the last being in October 2023.  He is on omeprazole.    History of obesity.  BMI of 36 currently.  Weight does vacillate.  Does admit to dietary indiscretions.    Had colonoscopy in 2020 with repeat to be done in 2030.    Review of Systems   Constitutional: Negative.    HENT: Negative.     Eyes: Negative.    Respiratory: Negative.     Cardiovascular: Negative.    Gastrointestinal: Negative.    Endocrine: Negative.    Genitourinary: Negative.    Musculoskeletal:  Positive for arthralgias and back pain.   Skin: Negative.    Allergic/Immunologic: Negative.    Neurological: Negative.    Hematological: Negative.    Psychiatric/Behavioral: Negative.       MEDICAL HISTORY:     Past Medical History:   Diagnosis Date    Crain's esophagus     GERD (gastroesophageal reflux disease)     Hypercholesterolemia        Past Surgical History:   Procedure Laterality Date    CARPAL TUNNEL RELEASE Bilateral 1998    COLONOSCOPY N/A 8/4/2020    Procedure: COLONOSCOPY;  Surgeon: Nael Andrade MD;  Location: [facility] ENDOSCOPY    ELBOW SURGERY Right 2015    Ulnar nerve    OTHER Right 1990    Shoulder surgery    TONSILLECTOMY  Age 7         Current Outpatient Medications:     Omeprazole 40 MG Oral Capsule Delayed Release, Take 1  capsule (40 mg total) by mouth daily., Disp: 90 capsule, Rfl: 3    ibuprofen 200 MG Oral Tab, Take 1 tablet (200 mg total) by mouth every 6 (six) hours as needed for Pain. Takes as needed for pain, Disp: , Rfl:     acetaminophen 325 MG Oral Tab, Take 1 tablet (325 mg total) by mouth every 6 (six) hours as needed for Pain. Takes only as needed, Disp: , Rfl:     Allergies:No Known Allergies    Social History     Socioeconomic History    Marital status:      Spouse name: Not on file    Number of children: Not on file    Years of education: Not on file    Highest education level: Not on file   Occupational History    Occupation:    Tobacco Use    Smoking status: Former     Packs/day: 2.00     Years: 14.00     Additional pack years: 0.00     Total pack years: 28.00     Types: Cigarettes     Quit date: 2010     Years since quittin.4    Smokeless tobacco: Never   Vaping Use    Vaping Use: Some days    Substances: THC   Substance and Sexual Activity    Alcohol use: Yes     Comment: 6 beers daily    Drug use: Yes     Types: Cannabis     Comment: daily    Sexual activity: Yes     Partners: Female   Other Topics Concern     Service Not Asked    Blood Transfusions Not Asked    Caffeine Concern Yes     Comment: Coffee 1 cup daily    Occupational Exposure Not Asked    Hobby Hazards Not Asked    Sleep Concern Not Asked    Stress Concern Not Asked    Weight Concern Not Asked    Special Diet Not Asked    Back Care Not Asked    Exercise Not Asked    Bike Helmet Not Asked    Seat Belt Not Asked    Self-Exams Not Asked    Grew up on a farm Not Asked    History of tanning Not Asked    Outdoor occupation Not Asked    Pt has a pacemaker Not Asked    Pt has a defibrillator Not Asked    Reaction to local anesthetic No   Social History Narrative    Not on file     Social Determinants of Health     Financial Resource Strain: Not on file   Food Insecurity: Not on file   Transportation Needs: Not on file    Physical Activity: Not on file   Stress: Not on file   Social Connections: Not on file   Housing Stability: Not on file           PHYSICAL EXAM:     Vitals:    03/19/24 1552   BP: 142/84   Pulse: 84   Resp: 18   Temp: 97.7 °F (36.5 °C)   TempSrc: Temporal   SpO2: 96%   Weight: 253 lb (114.8 kg)   Height: 5' 10\" (1.778 m)       Body mass index is 36.3 kg/m².    Physical Exam  Constitutional:       Appearance: Normal appearance. He is well-developed.   HENT:      Head: Normocephalic.      Right Ear: Tympanic membrane, ear canal and external ear normal. There is no impacted cerumen.      Left Ear: Tympanic membrane, ear canal and external ear normal. There is no impacted cerumen.   Eyes:      General: No scleral icterus.     Pupils: Pupils are equal, round, and reactive to light.   Neck:      Vascular: No JVD.   Cardiovascular:      Rate and Rhythm: Normal rate and regular rhythm.      Pulses: Normal pulses.      Heart sounds: Normal heart sounds. No murmur heard.  Pulmonary:      Effort: Pulmonary effort is normal. No respiratory distress.      Breath sounds: Normal breath sounds. No stridor. No wheezing, rhonchi or rales.   Chest:      Chest wall: No tenderness.   Abdominal:      General: Abdomen is flat. Bowel sounds are normal. There is no distension.      Palpations: Abdomen is soft.      Tenderness: There is no abdominal tenderness. There is no guarding or rebound.   Musculoskeletal:         General: Normal range of motion.      Cervical back: Normal range of motion.   Lymphadenopathy:      Cervical: No cervical adenopathy.   Skin:     General: Skin is warm.   Neurological:      General: No focal deficit present.      Mental Status: He is alert.      Cranial Nerves: No cranial nerve deficit.   Psychiatric:         Mood and Affect: Mood normal.         Behavior: Behavior normal.       Lab Results   Component Value Date    WBC 5.5 03/08/2024    RBC 5.21 03/08/2024    HGB 15.3 03/08/2024    HCT 43.5 03/08/2024     MCV 83.5 03/08/2024    MCH 29.4 03/08/2024    MCHC 35.2 03/08/2024    RDW 12.3 03/08/2024    .0 03/08/2024    MPV 8.2 02/05/2018     Lab Results   Component Value Date    GLU 89 03/08/2024    BUN 12 03/08/2024    BUNCREA 11.4 03/08/2024    CREATSERUM 1.05 03/08/2024    ANIONGAP 7 03/08/2024    GFRNAA 63 04/20/2022    GFRAA 72 04/20/2022    CA 10.0 03/08/2024    OSMOCALC 293 03/08/2024    ALKPHO 82 03/08/2024    AST 24 03/08/2024    ALT 24 03/08/2024    ALKPHOS 74 04/19/2016    BILT 0.7 03/08/2024    TP 7.3 03/08/2024    ALB 4.5 03/08/2024    GLOBULIN 2.8 03/08/2024    AGRATIO 1.4 04/19/2016     03/08/2024    K 4.3 03/08/2024     03/08/2024    CO2 27.0 03/08/2024     Lab Results   Component Value Date    CHOLEST 170 03/08/2024    TRIG 147 03/08/2024    HDL 46 03/08/2024    LDL 98 03/08/2024    VLDL 24 03/08/2024    NONHDLC 124 03/08/2024    CALCNONHDL 130 (H) 04/19/2016     Lab Results   Component Value Date    T4F 1.2 06/12/2021    TSH 1.132 03/08/2024     Lab Results   Component Value Date     06/12/2021    A1C 5.2 06/12/2021     Lab Results   Component Value Date    COLORUR Light-Yellow 03/08/2024    CLARITY Clear 03/08/2024    SPECGRAVITY 1.016 03/08/2024    GLUUR Normal 03/08/2024    BILUR Negative 03/08/2024    KETUR Negative 03/08/2024    BLOODURINE Negative 03/08/2024    PHURINE 6.0 03/08/2024    PROUR Negative 03/08/2024    UROBILINOGEN Normal 03/08/2024    NITRITE Negative 03/08/2024    LEUUR Negative 03/08/2024    ASCACIDURINE Negative 04/19/2016    NMIC Microscopic not indicated 03/08/2024    WBCUR 1-5 06/12/2021    RBCUR 0-2 06/12/2021    HYALCASTURN 1 12/17/2012    BACUR None Seen 06/12/2021           ASSESSMENT/PLAN:   1. Annual physical exam  Labs as above.    2. Hypercholesterolemia  Lipid panel as above.  LDL cholesterol greater than 70.  Does have a family history of premature cardiac disease but also in people with significant risk factors.  Will not start medications to  lower LDL cholesterol.    3. Family history of premature CAD  As per #2.    4. Alcohol abuse  Discussed importance of limiting alcohol intake given he already has Crain's esophagus.  Patient has cut back on his alcohol significantly over the past few months.    5. Crain's esophagus with dysplasia  Continue omeprazole.  Follow-up with gastroenterology.    6. Gastroesophageal reflux disease with esophagitis without hemorrhage  As per #5.    7. Class 1 drug-induced obesity without serious comorbidity with body mass index (BMI) of 33.0 to 33.9 in adult  Diet and exercise discussed.    8. Colon cancer screening  Repeat colonoscopy in 2030.    Return in about 6 months (around 9/19/2024) for Routine Follow-Up.    This note was prepared using Dragon Medical voice recognition dictation software. As a result errors may occur. When identified these errors have been corrected. While every attempt is made to correct errors during dictation discrepancies may still exist.    Zane Anaya MD  3/19/2024

## 2024-03-19 NOTE — PATIENT INSTRUCTIONS
Prevention Guidelines, Men Ages 50 to 64  Screening tests and vaccines are an important part of managing your health. A screening test is done to find possible disorders or diseases in people who don't have any symptoms. The goal is to find a disease early so lifestyle changes can be made and you can be watched more closely to reduce the risk of disease, or to detect it early enough to treat it most effectively. Screening tests are not considered diagnostic, but are used to determine if more testing is needed. Health counseling is essential, too. Below are guidelines for these, for men ages 50 to 64. Talk with your healthcare provider to make sure you’re up-to-date on what you need.  Screening Who needs it How often   Alcohol misuse All men in this age group At routine exams   Blood pressure All men in this age group Yearly checkup if your blood pressure is normal  Normal blood pressure is less than 120/80 mm Hg  If your blood pressure reading is higher than normal, follow the advice of your healthcare provider      Colorectal cancer All men in this age group Flexible sigmoidoscopy every 5 years, or colonoscopy every 10 years, or double-contrast barium enema every 5 years; yearly fecal occult blood test or fecal immunochemical test; or a stool DNA test as often as your healthcare provider advises; talk with your healthcare provider about which tests are best for you   Depression All men in this age group At routine exams   Type 2 diabetes or prediabetes All men beginning at age 45 and men without symptoms at any age who are overweight or obese and have 1 or more other risk factors for diabetes At least every 3 years (yearly if your blood sugar has already begun to rise)   Type 2 diabetes All men with prediabetes Every year   Hepatitis C Men at increased risk for infection - talk with your healthcare provider At routine exams. All men ages 50 to 70 should be tested at least once for hepatitis C.   High cholesterol or  triglycerides All men in this age group At least every 5 years   HIV Men at increased risk for infection - talk with your healthcare provider At routine exams   Lung cancer Adults age 55 to 80 who have smoked Yearly screening in smokers with 30 pack-year history of smoking or who quit within 15 years   Obesity All men in this age group At routine exams   Prostate cancer Starting at age 45, talk to healthcare provider about risks and benefits of digital rectal exam (BRIAN) and prostate-specific antigen (PSA) screening1 At routine exams   Syphilis Men at increased risk for infection - talk with your healthcare provider At routine exams   Tuberculosis Men at increased risk for infection - talk with your healthcare provider Ask your healthcare provider   Vision All men in this age group Ask your healthcare provider   Vaccine Who needs it How often   Chickenpox (varicella) All men in this age group who have no record of this infection or vaccine 2 doses; second dose should be given at least 4 weeks after the first dose   Hepatitis A Men at increased risk for infection - talk with your healthcare provider 2 doses given at least 6 months apart   Hepatitis B Men at increased risk for infection - talk with your healthcare provider 3 doses over 6 months; second dose should be given 1 month after the first dose; the third dose should be given at least 2 months after the second dose and at least 4 months after the first dose   Haemophilus influenzae Type B (HIB) Men at increased risk for infection - talk with your healthcare provider 1 to 3 doses   Influenza (flu) All men in this age group Once a year   Measles, mumps, rubella (MMR) Men in this age group through their late 50s who have no record of these infections or vaccines 1 or 2 doses; ask your healthcare provider   Meningococcal Men at increased risk for infection - talk with your healthcare provider 1 or more doses   Pneumococcal conjugate vaccine (PCV13) and pneumococcal  polysaccharide vaccine (PPSV23) Men at increased risk for infection - talk with your healthcare provider PCV13: 1 dose ages 19 to 65 (protects against 13 types of pneumococcal bacteria)     PPSV23: 1 to 2 doses through age 64, or 1 dose at 65 or older (protects against 23 types of pneumococcal bacteria)      Tetanus/diphtheria/  pertussis (Td/Tdap) booster All men in this age group Td every 10 years, or a one-time dose of Tdap instead of a Td booster after age 18, then Td every 10 years   Zoster All men ages 60 and older 1 dose   Counseling Who needs it How often   Diet and exercise Men who are overweight or obese When diagnosed, and then at routine exams   Sexually transmitted infection prevention Men at increased risk for infection - talk with your healthcare provider At routine exams   Use of daily aspirin Men in this age group at risk for cardiovascular health problems At routine exams   Use of tobacco and the health effects it can cause All men in this age group Every visit   [address] [place] [address] [facility]  Date Last Reviewed: 2/1/2017  © 0960-1219 [employer]. [address] [place] [address], [place] [address]. All rights reserved. This information is not intended as a substitute for professional medical care. Always follow your healthcare professional's instructions.

## 2024-03-21 ENCOUNTER — OFFICE VISIT (OUTPATIENT)
Dept: DERMATOLOGY CLINIC | Facility: CLINIC | Age: 54
End: 2024-03-21

## 2024-03-21 DIAGNOSIS — L57.0 ACTINIC KERATOSIS: ICD-10-CM

## 2024-03-21 DIAGNOSIS — L81.4 LENTIGINES: Primary | ICD-10-CM

## 2024-03-21 DIAGNOSIS — D22.9 MULTIPLE BENIGN NEVI: ICD-10-CM

## 2024-03-21 DIAGNOSIS — D18.01 CHERRY ANGIOMA: ICD-10-CM

## 2024-03-21 PROCEDURE — 99203 OFFICE O/P NEW LOW 30 MIN: CPT | Performed by: STUDENT IN AN ORGANIZED HEALTH CARE EDUCATION/TRAINING PROGRAM

## 2024-03-21 NOTE — PROGRESS NOTES
New Patient    Referred by: No referring provider defined for this encounter.    CHIEF COMPLAINT: Lesion of concern     HISTORY OF PRESENT ILLNESS: Lj De La Rosa is a 53 year old male here for evaluation of lesion of concern.    1. Growth   Location: Back of neck   Duration: 2-3 months   Signs and symptoms: None   Current treatment: None  Past treatments: None         Personal Dermatologic History  History of skin cancer: No  History of  atypical moles: No    FAMILY HISTORY:  History of melanoma: No    Past Medical History  Past Medical History:   Diagnosis Date    Crain's esophagus     GERD (gastroesophageal reflux disease)     Hypercholesterolemia        REVIEW OF SYSTEMS:  Constitutional: Denies fever, chills, unintentional weight loss.   Skin as per HPI    Medications  Current Outpatient Medications   Medication Sig Dispense Refill    Omeprazole 40 MG Oral Capsule Delayed Release Take 1 capsule (40 mg total) by mouth daily. 90 capsule 3    ibuprofen 200 MG Oral Tab Take 1 tablet (200 mg total) by mouth every 6 (six) hours as needed for Pain. Takes as needed for pain      acetaminophen 325 MG Oral Tab Take 1 tablet (325 mg total) by mouth every 6 (six) hours as needed for Pain. Takes only as needed         PHYSICAL EXAM:  General: awake, alert, no acute distress  Neuropsych: appropriate mood and affect  Eyes: Sclerae anicteric, without conjunctival injection, eyelids unremarkable  Skin: Skin exam was performed today including the following: face, neck. Pertinent findings include:   - neck with purple dome shaped papule ans regular skin colored papule  - face with stellata light brown macules  - nasal tip with a pink gritty papule    ASSESSMENT & PLAN:  Pathophysiology of diagnoses discussed with patient.  Therapeutic options reviewed. Risks, benefits, and alternatives discussed with patient. Instructions reviewed at length.  #Lentigines  #Seborrheic keratoses   #Cherry angiomas   - Reassurance provided  regarding the benign nature of these lesions.  - Discussed that treatment considered cosmetic and not covered by insurance.     #Multiple benign nevi  - Complete skin exam performed today with no outlier lesions identified   - Reassured patient of benign nature of these lesions.   - Return for lesions that are growing, changing or symptomatic.   - Recommend daily photoprotection with broad-spectrum sunscreen, avoidance of sun during peak hours, and sun protective clothing.    - Dermoscopy was used for physical examination of pigmented lesions during today's office visit.    #Actinic keratosis  - Patient to return for UBSE and cryotherapy. Deffered LN2 today.     Return to clinic: 1 month for LN2 and upper body  or sooner if something concerning arises     Darren Xavier MD

## 2024-10-15 RX ORDER — OMEPRAZOLE 40 MG/1
40 CAPSULE, DELAYED RELEASE ORAL DAILY
Qty: 90 CAPSULE | Refills: 3 | Status: SHIPPED | OUTPATIENT
Start: 2024-10-15

## 2024-10-15 NOTE — TELEPHONE ENCOUNTER
Requested Prescriptions     Pending Prescriptions Disp Refills    OMEPRAZOLE 40 MG Oral Capsule Delayed Release [Pharmacy Med Name: OMEPRAZOLE 40MG CAPSULES] 90 capsule 3     Sig: TAKE 1 CAPSULE(40 MG) BY MOUTH DAILY       LOV  4/12/2022  LR  7/28/2023  I called and left a voicemail message to call back to schedule a follow-up appt      em

## 2024-12-23 ENCOUNTER — HOSPITAL ENCOUNTER (EMERGENCY)
Facility: HOSPITAL | Age: 54
Discharge: HOME | End: 2024-12-23
Attending: EMERGENCY MEDICINE
Payer: COMMERCIAL

## 2024-12-23 ENCOUNTER — NURSE TRIAGE (OUTPATIENT)
Dept: INTERNAL MEDICINE CLINIC | Facility: CLINIC | Age: 54
End: 2024-12-23

## 2024-12-23 ENCOUNTER — APPOINTMENT (OUTPATIENT)
Dept: GENERAL RADIOLOGY | Facility: HOSPITAL | Age: 54
End: 2024-12-23
Attending: EMERGENCY MEDICINE
Payer: COMMERCIAL

## 2024-12-23 VITALS
OXYGEN SATURATION: 96 % | HEART RATE: 61 BPM | SYSTOLIC BLOOD PRESSURE: 128 MMHG | TEMPERATURE: 98 F | WEIGHT: 253.06 LBS | DIASTOLIC BLOOD PRESSURE: 73 MMHG | RESPIRATION RATE: 20 BRPM | HEIGHT: 70 IN | BODY MASS INDEX: 36.23 KG/M2

## 2024-12-23 DIAGNOSIS — R51.9 FRONTAL HEADACHE: ICD-10-CM

## 2024-12-23 DIAGNOSIS — R07.89 INTERMITTENT LEFT-SIDED CHEST PAIN: ICD-10-CM

## 2024-12-23 DIAGNOSIS — R03.0 ELEVATED BLOOD PRESSURE READING: Primary | ICD-10-CM

## 2024-12-23 LAB
ALBUMIN SERPL-MCNC: 4.8 G/DL (ref 3.2–4.8)
ALBUMIN/GLOB SERPL: 1.8 {RATIO} (ref 1–2)
ALP LIVER SERPL-CCNC: 86 U/L
ALT SERPL-CCNC: 21 U/L
ANION GAP SERPL CALC-SCNC: 8 MMOL/L (ref 0–18)
AST SERPL-CCNC: 21 U/L (ref ?–34)
BASOPHILS # BLD AUTO: 0.04 X10(3) UL (ref 0–0.2)
BASOPHILS NFR BLD AUTO: 0.5 %
BILIRUB SERPL-MCNC: 1.1 MG/DL (ref 0.3–1.2)
BUN BLD-MCNC: 17 MG/DL (ref 9–23)
BUN/CREAT SERPL: 14 (ref 10–20)
CALCIUM BLD-MCNC: 10.5 MG/DL (ref 8.7–10.4)
CHLORIDE SERPL-SCNC: 104 MMOL/L (ref 98–112)
CO2 SERPL-SCNC: 26 MMOL/L (ref 21–32)
CREAT BLD-MCNC: 1.21 MG/DL
D DIMER PPP FEU-MCNC: <0.27 UG/ML FEU (ref ?–0.53)
DEPRECATED RDW RBC AUTO: 37.2 FL (ref 35.1–46.3)
EGFRCR SERPLBLD CKD-EPI 2021: 72 ML/MIN/1.73M2 (ref 60–?)
EOSINOPHIL # BLD AUTO: 0.16 X10(3) UL (ref 0–0.7)
EOSINOPHIL NFR BLD AUTO: 2.2 %
ERYTHROCYTE [DISTWIDTH] IN BLOOD BY AUTOMATED COUNT: 12.3 % (ref 11–15)
GLOBULIN PLAS-MCNC: 2.7 G/DL (ref 2–3.5)
GLUCOSE BLD-MCNC: 102 MG/DL (ref 70–99)
HCT VFR BLD AUTO: 45 %
HGB BLD-MCNC: 15.5 G/DL
IMM GRANULOCYTES # BLD AUTO: 0.02 X10(3) UL (ref 0–1)
IMM GRANULOCYTES NFR BLD: 0.3 %
LYMPHOCYTES # BLD AUTO: 2.29 X10(3) UL (ref 1–4)
LYMPHOCYTES NFR BLD AUTO: 31.2 %
MCH RBC QN AUTO: 28.9 PG (ref 26–34)
MCHC RBC AUTO-ENTMCNC: 34.4 G/DL (ref 31–37)
MCV RBC AUTO: 84 FL
MONOCYTES # BLD AUTO: 0.44 X10(3) UL (ref 0.1–1)
MONOCYTES NFR BLD AUTO: 6 %
NEUTROPHILS # BLD AUTO: 4.4 X10 (3) UL (ref 1.5–7.7)
NEUTROPHILS # BLD AUTO: 4.4 X10(3) UL (ref 1.5–7.7)
NEUTROPHILS NFR BLD AUTO: 59.8 %
OSMOLALITY SERPL CALC.SUM OF ELEC: 288 MOSM/KG (ref 275–295)
PLATELET # BLD AUTO: 194 10(3)UL (ref 150–450)
POTASSIUM SERPL-SCNC: 3.7 MMOL/L (ref 3.5–5.1)
PROT SERPL-MCNC: 7.5 G/DL (ref 5.7–8.2)
RBC # BLD AUTO: 5.36 X10(6)UL
SODIUM SERPL-SCNC: 138 MMOL/L (ref 136–145)
TROPONIN I SERPL HS-MCNC: 7 NG/L
TROPONIN I SERPL HS-MCNC: 7 NG/L
WBC # BLD AUTO: 7.4 X10(3) UL (ref 4–11)

## 2024-12-23 PROCEDURE — 93005 ELECTROCARDIOGRAM TRACING: CPT

## 2024-12-23 PROCEDURE — 93010 ELECTROCARDIOGRAM REPORT: CPT

## 2024-12-23 PROCEDURE — 71045 X-RAY EXAM CHEST 1 VIEW: CPT | Performed by: EMERGENCY MEDICINE

## 2024-12-23 PROCEDURE — 85379 FIBRIN DEGRADATION QUANT: CPT | Performed by: EMERGENCY MEDICINE

## 2024-12-23 PROCEDURE — 84484 ASSAY OF TROPONIN QUANT: CPT | Performed by: EMERGENCY MEDICINE

## 2024-12-23 PROCEDURE — 85025 COMPLETE CBC W/AUTO DIFF WBC: CPT | Performed by: EMERGENCY MEDICINE

## 2024-12-23 PROCEDURE — 80053 COMPREHEN METABOLIC PANEL: CPT | Performed by: EMERGENCY MEDICINE

## 2024-12-23 PROCEDURE — 99284 EMERGENCY DEPT VISIT MOD MDM: CPT

## 2024-12-23 PROCEDURE — 96374 THER/PROPH/DIAG INJ IV PUSH: CPT

## 2024-12-23 RX ORDER — KETOROLAC TROMETHAMINE 30 MG/ML
30 INJECTION, SOLUTION INTRAMUSCULAR; INTRAVENOUS ONCE
Status: COMPLETED | OUTPATIENT
Start: 2024-12-23 | End: 2024-12-23

## 2024-12-23 RX ORDER — AMLODIPINE BESYLATE 5 MG/1
5 TABLET ORAL DAILY
Qty: 30 TABLET | Refills: 0 | Status: SHIPPED | OUTPATIENT
Start: 2024-12-23 | End: 2025-01-22

## 2024-12-23 RX ORDER — AMLODIPINE BESYLATE 5 MG/1
5 TABLET ORAL ONCE
Status: COMPLETED | OUTPATIENT
Start: 2024-12-23 | End: 2024-12-23

## 2024-12-23 NOTE — DISCHARGE INSTRUCTIONS
Thank you for seeking care at [facility] Emergency Department.  You have been seen and evaluated and noted to have elevated blood pressures.  The labs and EKG and workup in the ER today did not show any acute life-threatening findings.  It is important however that you follow-up with primary doctor and start the antihypertensive medicine as we described.  Please also follow-up with cardiology within 1 week, while you are low risk for cardiac disease given your family history it is important that you establish with cardiology as you will likely need outpatient stress testing     We reviewed the results from your visit in the emergency department.    Please read the instructions provided   If provided, take prescriptions as instructed.     You were found to have high blood pressure today and you need to have that rechecked by a primary doctor at a later date. Your physician may discuss lifestyle modifications including weight reduction, dietary sodium restriction, increased physical activity, and moderation in alcohol consumptions. If possible, please check your blood pressure at home and keep a blood pressure log to bring to your follow up visit with your physician..  Please read the instructions provided, and if given prescriptions, take as instructed.     Remember, your care process does not end after your visit today. Please follow-up with your doctor within 1-2 days for a follow-up check to ensure you are  improving, to see if you need any further evaluation/testing, or to evaluate for any alternate diagnoses.     Please return to the emergency department if you develop chest pain, shortness of breath, headaches, numbness/tingling, weakness, changes in speech, or if you develop any other new or concerning symptoms as these could be signs of more serious medical illness.    We hope you feel better.

## 2024-12-23 NOTE — TELEPHONE ENCOUNTER
Action Requested: Summary for Provider     []  Critical Lab, Recommendations Needed  [] Need Additional Advice  []   FYI    []   Need Orders  [] Need Medications Sent to Pharmacy  []  Other     SUMMARY: Per protocol advised  Office visit   Future Appointments   Date Time Provider Department Center   12/23/2024  3:45 PM Lida Boudreaux MD [facility]       Reason for call: Blood Pressure  Onset: Data Unavailable    Patient calling ( name and date of birth of patient verified ) was at work not feeling well B/p elevate d  220/110, 186/118  went to IC on Friday 138/95    Saturday 150/100  Today went to work  b/p 142/100    Felt flushed and had headache at work     Denies  no chest pain , no shortness of breath, no headache     Reviewed red flag symptoms to watch for of when to go to ER.     Patient verbalizes understanding and agrees with plan.     Reason for Disposition   Systolic BP >= 180 OR Diastolic >= 110    Protocols used: Blood Pressure - High-A-[place]

## 2024-12-23 NOTE — ED PROVIDER NOTES
[place] Emergency Department Note  Patient: Lj De La Rosa Age: 53 year old Sex: male      MRN: [id number]  : 1970    Patient Seen in: [facility] Emergency Department    History     Chief Complaint   Patient presents with    Hypertension     Stated Complaint: Hypertension    History obtained from: patient     This is a 53 year old male hx of HLD, GERD, presents to the ER with complaints of elevated blood pressures, left sided chest pain, headaches.  Patient states that over the past 3 to 4 days he has developed mild generalized frontal headache, no thunderclap or worst of life.  States when he was at work 3 days ago he checked his blood pressure and it was elevated.  Seen in urgent care and his blood pressure then came down on its own.  He was advised to follow-up with her primary doctor.  He actually made a primary doctor appointment for today however states he has been continuing to have some intermittent left lateral rib pain and some pain in his left arm.  He states he has had the symptoms previously and is unsure whether they are related to underlying arthritis and shoulder problems.  He states he is felt lightheaded and \"woozy\" at times as well.  Due to concerns for his elevated blood pressure and feeling unwell he came to the ER.  States he has been checking his blood pressure several times a day and it has been as high as the 170s systolic.  He denies any numbness or tingling his extremities.  Denies vision changes.  Denies shortness of breath, abdominal pain, vomiting, diarrhea, room spinning sensation, leg swelling, recent travel, or other complaints at this time.  He does not currently take antihypertensives.     Review of Systems:  Review of Systems  Positive for stated complaint: Hypertension. Constitutional and vital signs reviewed. All other systems reviewed and negative except as noted above.    Patient History:  Past Medical History:    Crain's esophagus    GERD  (gastroesophageal reflux disease)    Hypercholesterolemia       Past Surgical History:   Procedure Laterality Date    Carpal tunnel release Bilateral 1998    Colonoscopy N/A 2020    Procedure: COLONOSCOPY;  Surgeon: Nael Andrade MD;  Location: [facility] ENDOSCOPY    Elbow surgery Right 2015    Ulnar nerve    Other Right     Shoulder surgery    Tonsillectomy  Age 7        Family History   Problem Relation Age of Onset    Heart Disease Father         Details unclear    Diabetes Father     Other (COPD) Father     Lung Disorder Mother     Diabetes Sister     Other (Brain Cancer) Maternal Grandmother     Heart Disease Maternal Grandfather          MI age 54    Heart Disease Paternal Grandmother          MI age 60    Other (Brain Cancer) Paternal Grandfather        Specific Social Determinants of Health:   Social History     Socioeconomic History    Marital status:    Occupational History    Occupation:    Tobacco Use    Smoking status: Former     Current packs/day: 0.00     Average packs/day: 2.0 packs/day for 14.0 years (28.0 ttl pk-yrs)     Types: Cigarettes     Start date: 1996     Quit date: 2010     Years since quittin.2    Smokeless tobacco: Never   Vaping Use    Vaping status: Some Days    Substances: THC   Substance and Sexual Activity    Alcohol use: Yes     Comment: 6 beers daily    Drug use: Yes     Types: Cannabis     Comment: daily    Sexual activity: Yes     Partners: Female   Other Topics Concern    Caffeine Concern Yes     Comment: Coffee 1 cup daily    Pt has a pacemaker No    Pt has a defibrillator No    Reaction to local anesthetic No           PSFH elements reviewed from today and agreed except as otherwise stated in HPI.    Physical Exam     ED Triage Vitals [24 1410]   BP (!) 167/94   Pulse 72   Resp 18   Temp 98 °F (36.7 °C)   Temp src Temporal   SpO2 97 %   O2 Device None (Room air)       Current:/87   Pulse 61   Temp 98 °F (36.7 °C)  (Temporal)   Resp 17   Ht 177.8 cm (5' 10\")   Wt 114.8 kg   SpO2 97%   BMI 36.31 kg/m²         Physical Exam  Vitals and nursing note reviewed.   Constitutional:       General: He is not in acute distress.     Appearance: He is not ill-appearing.   HENT:      Head: Normocephalic and atraumatic.      Right Ear: External ear normal.      Left Ear: External ear normal.      Nose: Nose normal.      Mouth/Throat:      Pharynx: Oropharynx is clear.   Eyes:      Extraocular Movements: Extraocular movements intact.      Conjunctiva/sclera: Conjunctivae normal.      Pupils: Pupils are equal, round, and reactive to light.   Cardiovascular:      Rate and Rhythm: Normal rate and regular rhythm.      Heart sounds: No murmur heard.     Comments: +2 radial and DP pulses bilateral  Pulmonary:      Effort: Pulmonary effort is normal. No respiratory distress.      Breath sounds: No wheezing or rales.   Abdominal:      General: There is no distension.      Palpations: Abdomen is soft.      Tenderness: There is no abdominal tenderness. There is no guarding or rebound.   Musculoskeletal:         General: No deformity.      Cervical back: Neck supple. No tenderness.      Right lower leg: No edema.      Left lower leg: No edema.      Comments: Full range of motion of the left upper extremity with mild reproducible tenderness over the anterior left glenohumeral joint   Skin:     General: Skin is warm and dry.      Capillary Refill: Capillary refill takes less than 2 seconds.   Neurological:      General: No focal deficit present.      Mental Status: He is alert and oriented to person, place, and time.      Cranial Nerves: No cranial nerve deficit.      Comments: Strength is 5/5 bilateral upper extremities on handgrip, elbow flexion/extension, shoulder lateral abduction/adduction, bilateral lower extremities on hip flexion and ankle plantar/dorsiflexion. SILT bilat UE and LE throughout and symmetric. Finger to nose intact bilaterally.   Shin intact bilaterally.  +2 patellar reflexes bilaterally             ED Course   Labs:   Labs Reviewed   COMP METABOLIC PANEL (14) - Abnormal; Notable for the following components:       Result Value    Glucose 102 (*)     Calcium, Total 10.5 (*)     All other components within normal limits   TROPONIN I HIGH SENSITIVITY - Normal   TROPONIN I HIGH SENSITIVITY - Normal   D-DIMER - Normal   CBC WITH DIFFERENTIAL WITH PLATELET   RAINBOW DRAW BLUE     Radiology findings:   XR CHEST AP PORTABLE  (CPT=71045)    Result Date: 12/23/2024  CONCLUSION: No acute cardiopulmonary process    Dictated by (CST): Deric Hu MD on 12/23/2024 at 3:43 PM     Finalized by (CST): Deric Hu MD on 12/23/2024 at 3:44 PM           EKG as interpreted by me: EKG my interpretation shows sinus rhythm with PACs rate 76/min, left axis deviation, QTc 432 ms, no STEMI, no ectopy  Cardiac Monitor: Interpreted by me.   Pulse Readings from Last 1 Encounters:   12/23/24 61   , sinus,     HEART score: 3 (+2 risk factor, +1 age) - low risk     Pt low risk for aortic dissection, no sudden severe pain, no hx Marfan syndrome     MDM   This is a 53 year old male hx of HLD, GERD, presents to the ER with complaints of elevated blood pressures, left sided chest pain, headaches.   Patient is otherwise asymptomatic at this time without confusion, hematuria, or SOB.?Neurologic exam is nonfocal, patient without signs of fluid overload on exam.   BP today in the department is 167/94. Patient is not currently on antihypertensive medication.   Based on patient presentation, I have low suspicion for acute MI, heart failure, kidney failure, acute stroke, acute ICH or other end organ damage, but given his intermittent left arm/chest painw ill obtain troponin x2, dimer given low risk Wells but unable to exclude by PERC rule, cbc, cmp, ecg. If stable will plan for discharge home with close outpatient follow up. Toradol for headache and will trial x1 dose  amlodipine.     ED Course as of 12/23/24 1708  ------------------------------------------------------------  Time: 12/23 1546  Comment: Cbc unremarkable   ------------------------------------------------------------  Time: 12/23 1547  Comment: Trop normal. Cmp unremarkable   ------------------------------------------------------------  Time: 12/23 1552  Value: XR CHEST AP PORTABLE  (CPT=71045)  Comment:   FINDINGS:  CARDIAC/VASC: Normal.  No cardiac silhouette abnormality or cardiomegaly.  Unremarkable pulmonary vasculature.  MEDIAST/HEIDI:   No visible mass or adenopathy.  LUNGS/PLEURA: Normal.  No significant pulmonary parenchymal abnormalities.  No effusion or pleural thickening.  BONES: No fracture or visible bony lesion.  OTHER: Negative.                Impression  CONCLUSION: No acute cardiopulmonary process       ------------------------------------------------------------  Time: 12/23 1559  Value: D-Dimer: <0.27  Comment: D-dimer undetectable   ------------------------------------------------------------  Time: 12/23 1645  Value: BP: 147/87  Comment: (Reviewed)  ------------------------------------------------------------  Time: 12/23 1707  Value: Troponin I (High Sensitivity): 7  Comment: (Reviewed)  ------------------------------------------------------------  Time: 12/23 1707  Comment: Repeat troponin unchanged.  Labs are stable today.  Patient updated.  Blood pressure has come down to 140s over 80s after Norvasc.  Advised to start this medicine and follow-up closely with primary doctor in 2 to 3 days for recheck.  Advised him also given his family history of cardiac disease to establish with cardiology as he may require outpatient stress test though clinically he is not having any evidence of acute coronary syndrome or acute ischemic disease.  Return if new or worsening symptoms occur immediately.  He is comfortable with the plan for discharge.  All questions answered             Procedures:  Procedures        Disposition and Plan     Clinical Impression:  1. Elevated blood pressure reading    2. Intermittent left-sided chest pain    3. Frontal headache        Disposition:  Discharge    Follow-up:  Zane Anaya MD  [address] [facility] [id number] [phone]    Schedule an appointment as soon as possible for a visit in 2 day(s)      [facility] Emergency Department  [address] [facility] [address]  [phone]  Go to  If symptoms worsen, immediately    Chino Suarez MD  [address] [facility] [address]  [facility] [id number] [phone]    Schedule an appointment as soon as possible for a visit in 1 week(s)        Medications Prescribed:  Current Discharge Medication List        START taking these medications    Details   amLODIPine 5 MG Oral Tab Take 1 tablet (5 mg total) by mouth daily.  Qty: 30 tablet, Refills: 0               This note may have been created using voice dictation technology and may include inadvertent errors.      Kinza Sanchez, DO  Attending Physician   Emergency Medicine

## 2024-12-23 NOTE — ED INITIAL ASSESSMENT (HPI)
Pt to ED with concerns for elevated blood pressure. Pt states systolic in the 200's on Friday when checked at work. Pt states seen at an [facility] on Friday with similar symptoms. Pt states he has been checking blood pressure at home since and has been \"high\". Pt denies hx of hypertension. Pt denies chest pain. Pt states +dyspnea with exertion. No respiratory distress noted. Pt is alert and oriented x4. Pt skin parameters WNL. Pt states had a PCP visit today but cancelled to be seen here. Pt states he \"doesn't feel right\".

## 2024-12-24 LAB
ATRIAL RATE: 76 BPM
P AXIS: 29 DEGREES
P-R INTERVAL: 168 MS
Q-T INTERVAL: 384 MS
QRS DURATION: 92 MS
QTC CALCULATION (BEZET): 432 MS
R AXIS: -31 DEGREES
T AXIS: 11 DEGREES
VENTRICULAR RATE: 76 BPM

## 2024-12-26 ENCOUNTER — OFFICE VISIT (OUTPATIENT)
Dept: INTERNAL MEDICINE CLINIC | Facility: CLINIC | Age: 54
End: 2024-12-26

## 2024-12-26 VITALS
HEIGHT: 70 IN | DIASTOLIC BLOOD PRESSURE: 78 MMHG | WEIGHT: 256.81 LBS | SYSTOLIC BLOOD PRESSURE: 120 MMHG | BODY MASS INDEX: 36.77 KG/M2 | HEART RATE: 70 BPM

## 2024-12-26 DIAGNOSIS — I10 PRIMARY HYPERTENSION: Primary | ICD-10-CM

## 2024-12-26 DIAGNOSIS — Z82.49 FAMILY HISTORY OF PREMATURE CAD: ICD-10-CM

## 2024-12-26 DIAGNOSIS — R42 DIZZINESS: ICD-10-CM

## 2024-12-26 DIAGNOSIS — R07.82 INTERCOSTAL PAIN: ICD-10-CM

## 2024-12-26 DIAGNOSIS — F10.10 ALCOHOL ABUSE: ICD-10-CM

## 2024-12-26 PROCEDURE — 99214 OFFICE O/P EST MOD 30 MIN: CPT | Performed by: INTERNAL MEDICINE

## 2024-12-26 PROCEDURE — G2211 COMPLEX E/M VISIT ADD ON: HCPCS | Performed by: INTERNAL MEDICINE

## 2024-12-26 PROCEDURE — 3078F DIAST BP <80 MM HG: CPT | Performed by: INTERNAL MEDICINE

## 2024-12-26 PROCEDURE — 3008F BODY MASS INDEX DOCD: CPT | Performed by: INTERNAL MEDICINE

## 2024-12-26 PROCEDURE — 3074F SYST BP LT 130 MM HG: CPT | Performed by: INTERNAL MEDICINE

## 2024-12-26 NOTE — PROGRESS NOTES
Patient ID: Lj De La Rosa is a 53 year old male.  Chief Complaint   Patient presents with    ER F/U     12/23/24 - Elevated BP/chest pain/headaches/dizziness - started amlodipine states this morning was 160/95         HISTORY OF PRESENT ILLNESS:   HPI  Patient presents for for above.  Here for multiple issues.    Approximately 1 week ago was having dizzy spells at work with a pulsation sensation in his head.  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  He did go to immediate care center where blood pressure improved with time and was discharged without any intervention.  Symptoms persisted at home and patient decided to go to emergency room 3 days ago.  He was found to have elevated blood pressure and started on amlodipine.  He was having chest pains as well which prompted serial troponins to be done which were negative.  EKG showed no acute abnormalities.  Chest x-ray was normal.  Has been checking his blood pressure at home and remains elevated despite starting amlodipine.  He does have a family history of premature cardiac disease.  He does have a significant alcohol usage history but stopped as of the day symptoms began last week.  He has had multiple stress test in the past.  He is also complaining of dizziness.    Review of Systems  Ten point review of systems otherwise negative with the exception of HPI and assessment and plan.    MEDICAL HISTORY:     Past Medical History:    Crain's esophagus    GERD (gastroesophageal reflux disease)    Hypercholesterolemia    Primary hypertension       Past Surgical History:   Procedure Laterality Date    Carpal tunnel release Bilateral 1998    Colonoscopy N/A 8/4/2020    Procedure: COLONOSCOPY;  Surgeon: Nael Andrade MD;  Location: [facility] ENDOSCOPY    Elbow surgery Right 2015    Ulnar nerve    Other Right 1990    Shoulder surgery    Tonsillectomy  Age 7         Current Outpatient Medications:     amLODIPine 5 MG Oral Tab, Take 1  tablet (5 mg total) by mouth daily., Disp: 30 tablet, Rfl: 0    OMEPRAZOLE 40 MG Oral Capsule Delayed Release, TAKE 1 CAPSULE(40 MG) BY MOUTH DAILY, Disp: 90 capsule, Rfl: 3    Allergies:Allergies[1]    Social History     Socioeconomic History    Marital status:      Spouse name: Not on file    Number of children: Not on file    Years of education: Not on file    Highest education level: Not on file   Occupational History    Occupation:    Tobacco Use    Smoking status: Former     Current packs/day: 0.00     Average packs/day: 2.0 packs/day for 14.0 years (28.0 ttl pk-yrs)     Types: Cigarettes     Start date: 1996     Quit date: 2010     Years since quittin.2    Smokeless tobacco: Never   Vaping Use    Vaping status: Some Days    Substances: THC   Substance and Sexual Activity    Alcohol use: Yes     Comment: 6 beers daily    Drug use: Yes     Types: Cannabis     Comment: daily    Sexual activity: Yes     Partners: Female   Other Topics Concern     Service Not Asked    Blood Transfusions Not Asked    Caffeine Concern Yes     Comment: Coffee 1 cup daily    Occupational Exposure Not Asked    Hobby Hazards Not Asked    Sleep Concern Not Asked    Stress Concern Not Asked    Weight Concern Not Asked    Special Diet Not Asked    Back Care Not Asked    Exercise Not Asked    Bike Helmet Not Asked    Seat Belt Not Asked    Self-Exams Not Asked    Grew up on a farm Not Asked    History of tanning Not Asked    Outdoor occupation Not Asked    Pt has a pacemaker No    Pt has a defibrillator No    Reaction to local anesthetic No   Social History Narrative    Not on file     Social Drivers of Health     Financial Resource Strain: Not on file   Food Insecurity: Not on file   Transportation Needs: Not on file   Physical Activity: Not on file   Stress: Not on file   Social Connections: Not on file   Housing Stability: Not on file           PHYSICAL EXAM:     Vitals:    24 1223   BP: 120/78    Pulse: 70   Weight: 256 lb 12.8 oz (116.5 kg)   Height: 5' 10\" (1.778 m)       Body mass index is 36.85 kg/m².    Physical Exam  Eyes:      General: No scleral icterus.  Cardiovascular:      Rate and Rhythm: Normal rate and regular rhythm.      Pulses: Normal pulses.      Heart sounds: Normal heart sounds. No murmur heard.  Pulmonary:      Effort: Pulmonary effort is normal. No respiratory distress.      Breath sounds: Normal breath sounds. No stridor. No wheezing or rhonchi.   Chest:      Chest wall: No tenderness.   Abdominal:      General: Abdomen is flat. Bowel sounds are normal.      Palpations: Abdomen is soft.   Neurological:      Mental Status: He is alert.   Psychiatric:         Mood and Affect: Mood is anxious.           ASSESSMENT/PLAN:   1. Primary hypertension  CARD NUC STRESS TEST LEXISCAN (CPT 40617/08263/); Future  Normal reading today.  Low salt diet.  Continue amlodipine.    2. Family history of premature CAD  CARD NUC STRESS TEST LEXISCAN (CPT 12261/30621/); Future    3. Intercostal pain  CARD NUC STRESS TEST LEXISCAN (CPT 71606/76822/); Future    4. Dizziness  CT BRAIN OR HEAD (CPT=70450); Future    5. Alcohol abuse  Continue to refrain from alcohol.    Return in about 2 weeks (around 1/9/2025) for Routine Follow-Up, Blood pressure.    This note was prepared using Dragon Medical voice recognition dictation software. As a result errors may occur. When identified these errors have been corrected. While every attempt is made to correct errors during dictation discrepancies may still exist.    Zane Anaya MD  12/26/2024       [1] No Known Allergies

## 2024-12-30 ENCOUNTER — HOSPITAL ENCOUNTER (OUTPATIENT)
Dept: CT IMAGING | Age: 54
Discharge: HOME | End: 2024-12-30
Attending: INTERNAL MEDICINE
Payer: COMMERCIAL

## 2024-12-30 DIAGNOSIS — R42 DIZZINESS: ICD-10-CM

## 2024-12-30 PROCEDURE — 70450 CT HEAD/BRAIN W/O DYE: CPT | Performed by: INTERNAL MEDICINE

## 2025-01-16 ENCOUNTER — MED REC SCAN ONLY (OUTPATIENT)
Dept: INTERNAL MEDICINE CLINIC | Facility: CLINIC | Age: 55
End: 2025-01-16

## 2025-03-21 ENCOUNTER — LAB REQUISITION (OUTPATIENT)
Dept: LAB | Facility: HOSPITAL | Age: 55
End: 2025-03-21
Payer: COMMERCIAL

## 2025-03-21 DIAGNOSIS — M25.462 EFFUSION, LEFT KNEE: ICD-10-CM

## 2025-03-21 LAB
BASOPHILS NFR SNV: 0 %
EOSINOPHIL NFR SNV: 0 %
LYMPHOCYTES NFR SNV: 14 %
MONOS+MACROS NFR SNV: 61 %
NEUTROPHILS NFR FLD: 25 %
RBC # FLD AUTO: ABNORMAL /CUMM (ref ?–1)
TOTAL CELLS COUNTED FLD: 100
TOTAL CELLS COUNTED SNV: 77 /CUMM (ref 0–200)
WBC # SNV: 77 /CUMM

## 2025-03-21 PROCEDURE — 89060 EXAM SYNOVIAL FLUID CRYSTALS: CPT | Performed by: ORTHOPAEDIC SURGERY

## 2025-03-21 PROCEDURE — 87070 CULTURE OTHR SPECIMN AEROBIC: CPT

## 2025-03-21 PROCEDURE — 89051 BODY FLUID CELL COUNT: CPT | Performed by: ORTHOPAEDIC SURGERY

## 2025-03-21 PROCEDURE — 87070 CULTURE OTHR SPECIMN AEROBIC: CPT | Performed by: ORTHOPAEDIC SURGERY

## 2025-03-21 PROCEDURE — 87205 SMEAR GRAM STAIN: CPT | Performed by: ORTHOPAEDIC SURGERY

## 2025-03-21 PROCEDURE — 89060 EXAM SYNOVIAL FLUID CRYSTALS: CPT

## 2025-03-21 PROCEDURE — 89050 BODY FLUID CELL COUNT: CPT | Performed by: ORTHOPAEDIC SURGERY

## 2025-03-21 PROCEDURE — 89050 BODY FLUID CELL COUNT: CPT

## 2025-03-26 ENCOUNTER — OFFICE VISIT (OUTPATIENT)
Facility: CLINIC | Age: 55
End: 2025-03-26

## 2025-03-26 ENCOUNTER — TELEPHONE (OUTPATIENT)
Facility: CLINIC | Age: 55
End: 2025-03-26

## 2025-03-26 VITALS
DIASTOLIC BLOOD PRESSURE: 76 MMHG | SYSTOLIC BLOOD PRESSURE: 130 MMHG | BODY MASS INDEX: 36.79 KG/M2 | WEIGHT: 257 LBS | HEIGHT: 70 IN | HEART RATE: 64 BPM

## 2025-03-26 DIAGNOSIS — K21.9 GASTROESOPHAGEAL REFLUX DISEASE, UNSPECIFIED WHETHER ESOPHAGITIS PRESENT: ICD-10-CM

## 2025-03-26 DIAGNOSIS — K22.70 BARRETT'S ESOPHAGUS WITHOUT DYSPLASIA: Primary | ICD-10-CM

## 2025-03-26 DIAGNOSIS — K21.9 GASTROESOPHAGEAL REFLUX DISEASE, UNSPECIFIED WHETHER ESOPHAGITIS PRESENT: Primary | ICD-10-CM

## 2025-03-26 PROCEDURE — 3008F BODY MASS INDEX DOCD: CPT | Performed by: INTERNAL MEDICINE

## 2025-03-26 PROCEDURE — 3078F DIAST BP <80 MM HG: CPT | Performed by: INTERNAL MEDICINE

## 2025-03-26 PROCEDURE — 99214 OFFICE O/P EST MOD 30 MIN: CPT | Performed by: INTERNAL MEDICINE

## 2025-03-26 PROCEDURE — 3075F SYST BP GE 130 - 139MM HG: CPT | Performed by: INTERNAL MEDICINE

## 2025-03-26 RX ORDER — AMLODIPINE BESYLATE 5 MG/1
5 TABLET ORAL DAILY
COMMUNITY
Start: 2025-03-22

## 2025-03-26 NOTE — PATIENT INSTRUCTIONS
Barretts esophagus  GERD  - continue on omeprazole   - EGD with MAC in Dec   - healthy diet and weight - call Dr. Jameson [phone]     Colonoscopy due in 2030

## 2025-03-26 NOTE — TELEPHONE ENCOUNTER
Scheduled for:  EGD 79231  Provider Name:   Raymond  Date:  12/8/2025  Location:  [facility]  Sedation:  MAC  Time:  8:30 am. (pt is aware that ENDO will call the day before the procedure to confirm arrival time)  Prep:  NPO after midnight  Meds/Allergies Reconciled?:  Physician Reviewed  Diagnosis with codes:    Crain's Esophagus K22.90  GERD K21.9  Was patient informed to call insurance with codes (Y/N):  Yes  Referral sent?:  Referral was sent at the time of electronic surgical scheduling.  [facility] or [facility] notified?:  I sent an electronic request to Endo Scheduling and received a confirmation today.  Medication Orders:  Patient is aware to NOT take iron pills, herbal meds and diet supplements for 7 days before exam. Also to NOT take any form of alcohol, recreational drugs and any forms of ED meds 24-72 hours before exam.   Misc Orders:  N/A   Further instructions given by staff:  I discussed the prep instructions with the patient which he verbally understood. I provided patient with prep instruction's sheet in office.      Patient was informed about the new cancellation policy for his/her procedure. Patient was also given a copy of the cancellation policy at the time of the appointment and verbalized understanding.

## 2025-03-26 NOTE — PROGRESS NOTES
Lj De La Rosa is a 54 year old male.    HPI:     Chief Complaint   Patient presents with    Follow - Up   The patient is a 54-year-old male who has a history of chronic GERD/Crain's esophagus, hypertension, back and knee issues who follows up in the office today.    The patient has a history of Crain's, his last upper endoscopy was in , samples taken negative.    He has been working on trying to eat healthier, he is interested in weight loss.  He does have back issues and knee issues.    Colonoscopy and EGD were performed back in , internal hemorrhoids were noted and recall for 10 years was advised.  He denies any lower symptoms at this time, no blood per rectum.      HISTORY:  Past Medical History:    Crain's esophagus    GERD (gastroesophageal reflux disease)    Hypercholesterolemia    Primary hypertension      Past Surgical History:   Procedure Laterality Date    Carpal tunnel release Bilateral     Colonoscopy N/A 2020    Procedure: COLONOSCOPY;  Surgeon: Nael Andrade MD;  Location: [facility] ENDOSCOPY    Elbow surgery Right     Ulnar nerve    Other Right     Shoulder surgery    Tonsillectomy  Age 7      Family History   Problem Relation Age of Onset    Heart Disease Father         Details unclear    Diabetes Father     Other (COPD) Father     Lung Disorder Mother     Diabetes Sister     Other (Brain Cancer) Maternal Grandmother     Heart Disease Maternal Grandfather          MI age 54    Heart Disease Paternal Grandmother          MI age 60    Other (Brain Cancer) Paternal Grandfather       Social History:   Social History     Socioeconomic History    Marital status:    Occupational History    Occupation:    Tobacco Use    Smoking status: Former     Current packs/day: 0.00     Average packs/day: 2.0 packs/day for 14.0 years (28.0 ttl pk-yrs)     Types: Cigarettes     Start date: 1996     Quit date: 2010     Years since quittin.4    Smokeless  tobacco: Never   Vaping Use    Vaping status: Some Days    Substances: THC   Substance and Sexual Activity    Alcohol use: Yes     Comment: 6 beers daily    Drug use: Not Currently     Types: Cannabis     Comment: daily    Sexual activity: Yes     Partners: Female   Other Topics Concern    Caffeine Concern Yes     Comment: Coffee 1 cup daily    Pt has a pacemaker No    Pt has a defibrillator No    Reaction to local anesthetic No        Medications (Active prior to today's visit):  Current Outpatient Medications   Medication Sig Dispense Refill    amLODIPine 5 MG Oral Tab Take 1 tablet (5 mg total) by mouth daily.      OMEPRAZOLE 40 MG Oral Capsule Delayed Release TAKE 1 CAPSULE(40 MG) BY MOUTH DAILY 90 capsule 3       Allergies:  Allergies[1]      ROS:   The patient denies any chest pain or shortness of breath,  No neurologic or dermatologic symptoms.     PHYSICAL EXAM:   Blood pressure 130/76, pulse 64, height 5' 10\" (1.778 m), weight 257 lb (116.6 kg).    The patient appears their stated age and is in no acute distress  HEENT- anicteric sclera, neck no lymphadnopathy, OP- clear with no masses or lesions  Chest- Clear bilaterally, no wheezing,  Heart- regular rate, no murmur or gallop  Abdomen- Soft and nontender, nondistended  Ext- no clubbing or cyanosis  Skin- no rashes or lesions  Neuro- appropriate response, alert, no confusion  .  ASSESSMENT/PLAN:   Assessment     The patient is a 54-year-old male who has a history of chronic reflux, Crain's esophagus.  Symptom wise he is doing well, we discussed Crain's esophagus, the risks of progression to esophageal cancer were reviewed.  Advised that he continue on omeprazole.  EGD is due in December.  Risk and benefits reviewed and agrees with proceeding.    He is interested in weight loss options and placed a referral to see Dr. Jameson.    Plan  Barretts esophagus  GERD  - continue on omeprazole   - EGD with MAC in Dec   - healthy diet and weight - call Dr. Jameson  [phone]     Colonoscopy due in 2030        Orders This Visit:  No orders of the defined types were placed in this encounter.      Meds This Visit:  Requested Prescriptions      No prescriptions requested or ordered in this encounter       Imaging & Referrals:  None       Nael Andrade MD  [facility]              [1] No Known Allergies

## 2025-06-24 ENCOUNTER — OFFICE VISIT (OUTPATIENT)
Dept: SURGERY | Facility: CLINIC | Age: 55
End: 2025-06-24
Payer: COMMERCIAL

## 2025-06-24 VITALS
DIASTOLIC BLOOD PRESSURE: 82 MMHG | WEIGHT: 260 LBS | OXYGEN SATURATION: 98 % | BODY MASS INDEX: 38.95 KG/M2 | HEIGHT: 68.5 IN | SYSTOLIC BLOOD PRESSURE: 130 MMHG | HEART RATE: 64 BPM

## 2025-06-24 DIAGNOSIS — E66.812 OBESITY, CLASS II, BMI 35-39.9: ICD-10-CM

## 2025-06-24 DIAGNOSIS — K22.719 BARRETT'S ESOPHAGUS WITH DYSPLASIA: ICD-10-CM

## 2025-06-24 DIAGNOSIS — R06.83 SNORING: ICD-10-CM

## 2025-06-24 DIAGNOSIS — Z82.49 FAMILY HISTORY OF PREMATURE CAD: ICD-10-CM

## 2025-06-24 DIAGNOSIS — K21.00 GASTROESOPHAGEAL REFLUX DISEASE WITH ESOPHAGITIS WITHOUT HEMORRHAGE: ICD-10-CM

## 2025-06-24 DIAGNOSIS — E78.00 HYPERCHOLESTEROLEMIA: ICD-10-CM

## 2025-06-24 DIAGNOSIS — I10 PRIMARY HYPERTENSION: Primary | ICD-10-CM

## 2025-06-24 PROCEDURE — 3008F BODY MASS INDEX DOCD: CPT | Performed by: INTERNAL MEDICINE

## 2025-06-24 PROCEDURE — 3075F SYST BP GE 130 - 139MM HG: CPT | Performed by: INTERNAL MEDICINE

## 2025-06-24 PROCEDURE — 99205 OFFICE O/P NEW HI 60 MIN: CPT | Performed by: INTERNAL MEDICINE

## 2025-06-24 PROCEDURE — 3079F DIAST BP 80-89 MM HG: CPT | Performed by: INTERNAL MEDICINE

## 2025-06-24 RX ORDER — TIRZEPATIDE 2.5 MG/.5ML
2.5 INJECTION, SOLUTION SUBCUTANEOUS WEEKLY
Qty: 2 ML | Refills: 0 | Status: SHIPPED | OUTPATIENT
Start: 2025-06-24 | End: 2025-07-16

## 2025-06-24 NOTE — PROGRESS NOTES
CC:   Chief Complaint   Patient presents with    Consult    Weight Management        Referring Physician to whom this note will be reported back to: Zane Anaya MD   Reason for medical consultation: Medical Management of Weight and Weight related comorbidities.      HPI:   -BP rechecked in the office, discussed importance of taking BP medications, declined any cardiac or respiratory symptoms today. Discussed to go to the ER if she has any red flag symptoms. Instructed to follow up with PCP within 1 week  - HTN on amlodipine, recent episode in 12/2024 cardiac workup negative  - GERD/ maharaj's esophagus stable, on PPI, scheduled for a follow up in 12/2025  - sleep study years ago, BRAYAN?. Used mouth piece for while. Just using 2 pillows and he thinks it helps, he declined a sleep study today    Body mass index is 38.96 kg/m².   Wt Readings from Last 6 Encounters:   06/24/25 260 lb (117.9 kg)   03/26/25 257 lb (116.6 kg)   12/26/24 256 lb 12.8 oz (116.5 kg)   12/23/24 253 lb 1.4 oz (114.8 kg)   03/19/24 253 lb (114.8 kg)   12/11/23 250 lb (113.4 kg)     /84   Pulse 64   Ht 5' 8.5\" (1.74 m)   Wt 260 lb (117.9 kg)   SpO2 98%   BMI 38.96 kg/m²   Vitals:    06/24/25 0813   BP: 140/84   Pulse: 64     Body mass index is 38.96 kg/m².  Waist Circumference: 49.5 inches       Typical Dietary Intake:  Breakfast AM Snack Lunch PM Snack Dinner   Banana  skip Beef tacos  rice Ice cream 4 tacos     Soda Drinker?: No  Drinks beer daily, 5x    Number of restaurant or fast food meals/week:  1 meals/week    LABS and RESULTS:     Lab Requisition on 03/21/2025   Component Date Value    Body Fluid Culture Result 03/21/2025 No Growth 5 Days     Body Fluid Smear 03/21/2025 No WBCs seen     Body Fluid Smear 03/21/2025 No organisms seen     Body Fluid Smear 03/21/2025 This is a cytocentrifuged smear.     Body Fluid Source 03/21/2025 Left Knee, Synovial     Fluid Crystals 03/21/2025 None Seen     Body Fluid Color 03/21/2025 Orange      Body Fluid Clarity 03/21/2025 Cloudy     TNC, Synovial Fluid 03/21/2025 77     RBC Synovial 03/21/2025 12,543 (H)     Body Fluid Source 03/21/2025 Left Knee, Synovial     WBC Calculated, Synovial* 03/21/2025 77     Neutrophil Synovial 03/21/2025 25     Lymph Synovial 03/21/2025 14     Mon/Mac/Hist Synovial 03/21/2025 61     Eosinophil Synovial 03/21/2025 0     Basophil Synovial 03/21/2025 0     Total Cells Counted 03/21/2025 100        Current Medications[1]   Past Medical History[2]  Past Surgical History[3]    Social History:  Short Social Hx on File[4]  Family History:  Family History[5]       REVIEW OF SYSTEMS:   10 point review of systems otherwise negative.  With the exception of HPI and assessment and plan      EXAM:     GENERAL: NAD, conversant  SKIN: good turgor, no jaundice  HEENT: nl external nose and ears  NECK: supple  LUNGS: nl effort, clear to auscultation bilaterally  CARDIO: RRR, no murmur  ABDOMEN: NT/ND, no masses  EXTREMITIES: gait normal, no edema   PSYCH: Oriented times three, appropriate affect      ASSESSMENT AND PLAN:     1. Primary hypertension  2. Hypercholesterolemia  3. Family history of premature CAD  4. Obesity, Class II, BMI 35-39.9  5. Gastroesophageal reflux disease with esophagitis without hemorrhage  6. Crain's esophagus with dysplasia     - Initial weight 260 lb (117.9 kg), Initial Body mass index is 38.96 kg/m².  - The patient participated in a comprehensive weight management program that encourages behavioral modification, reduced calorie diet and increased physical activity with continuing follow up for at least 6 months prior to using drug therapy.  - patient is interested in GLP1 medications, patient denies any personal or family history of MTC or in patients with Multiple Endocrine Neoplasia syndrome type 2 (MEN 2). Counseled patient regarding the potential risk for MTC with the use of semaglutide and inform them of symptoms of thyroid tumors (eg, a mass in the neck,  dysphagia, dyspnea, persistent hoarseness).     Plan:  - Tirzepatide-Weight Management (ZEPBOUND) 2.5 MG/0.5ML Subcutaneous Solution Auto-injector; Inject 2.5 mg into the skin once a week for 4 doses.  Dispense: 2 mL; Refill: 0    Regarding Obesity:  Follow up with dietitian and psychologist as recommended.  Discussed the role of sleep and stress in weight management.  Labs orders as above.  Counseled on comprehensive weight loss plan including attention to nutrition, exercise and behavior/stress management for success. See patient instruction below for more details.  Weight Loss Consent to treat reviewed and signed.    Regarding weight loss Medications.  Medication use and side effects reviewed with patient.    Medication will be used with a reduced calorie diet and increased physical activity in the management of exogenous obesity.  Patient will be responsible to let me know of any side effects or complications with medications.               Return in about 4 weeks (around 7/22/2025).     Kandace Valdes MD          [1]   Current Outpatient Medications   Medication Sig Dispense Refill    Tirzepatide-Weight Management (ZEPBOUND) 2.5 MG/0.5ML Subcutaneous Solution Auto-injector Inject 2.5 mg into the skin once a week for 4 doses. 2 mL 0    amLODIPine 5 MG Oral Tab Take 1 tablet (5 mg total) by mouth daily.      OMEPRAZOLE 40 MG Oral Capsule Delayed Release TAKE 1 CAPSULE(40 MG) BY MOUTH DAILY 90 capsule 3   [2]   Past Medical History:   Crain's esophagus    GERD (gastroesophageal reflux disease)    Hypercholesterolemia    Primary hypertension   [3]   Past Surgical History:  Procedure Laterality Date    Carpal tunnel release Bilateral 1998    Colonoscopy N/A 8/4/2020    Procedure: COLONOSCOPY;  Surgeon: Nael Andrade MD;  Location: [facility] ENDOSCOPY    Elbow surgery Right 2015    Ulnar nerve    Other Right 1990    Shoulder surgery    Tonsillectomy  Age 7   [4]   Social History  Socioeconomic History    Marital  status:    Occupational History    Occupation:    Tobacco Use    Smoking status: Former     Current packs/day: 0.00     Average packs/day: 2.0 packs/day for 14.0 years (28.0 ttl pk-yrs)     Types: Cigarettes     Start date: 1996     Quit date: 2010     Years since quittin.7    Smokeless tobacco: Never   Vaping Use    Vaping status: Some Days    Substances: THC   Substance and Sexual Activity    Alcohol use: Yes     Comment: 6 beers daily    Drug use: Not Currently     Types: Cannabis     Comment: daily    Sexual activity: Yes     Partners: Female   Other Topics Concern    Caffeine Concern Yes     Comment: Coffee 1 cup daily    Pt has a pacemaker No    Pt has a defibrillator No    Reaction to local anesthetic No   [5]   Family History  Problem Relation Age of Onset    Heart Disease Father         Details unclear    Diabetes Father     Other (COPD) Father     Lung Disorder Mother     Diabetes Sister     Other (Brain Cancer) Maternal Grandmother     Heart Disease Maternal Grandfather          MI age 54    Heart Disease Paternal Grandmother          MI age 60    Other (Brain Cancer) Paternal Grandfather

## 2025-06-24 NOTE — PATIENT INSTRUCTIONS
Please try to work on the following dietary changes:  Goals: Aim for 20-30 grams of protein/ meal  Aim for <100 grams of carbohydrates/day  Eat 4-6 vegetables/day  Avoid skipping meals- eat every 4-5 hours  Aim for 3 meals/day  2. Drink lots of water and cut down on soda/juice consumption if soda/juice drinker  3. Focus on protein: (15-30 grams with each meal) ie. greek yogurt, cottage cheese, string cheese, hard boiled eggs  4. Healthy snacks: always have protein in your snack! peanut butter and apples, hummus and carrots, berries, nuts (1/4 cup), tuna and crackers                 Protein Shakes: Premier protein or Core Power                Protein Bars: Rx Bars, Oatmega, Power Crunch                 Sargento balanced breaks (cheese and nuts)- without chocolate  5. Reduce carbohydrates <100 grams which includes sweets as well as rice, pasta, potatoes, bread, corn and instead choose whole grain options or more protein or vegetables (4-6 servings of vegetables per day). Use Rawlemon jeff for carb counting!  6. Get a good night of sleep  7. Try to decrease stress in life; meditate at least 10 minutes before sleeping! Try it and let me know what works for you, try youInCrowdube or apps like Calm and JCD!     Please download apps:  1. \"My Fitness Pal\" (other option is Lose it)) to help you to monitor daily dietary intake and you will be able to see if you are eating the right amount of calories, protein, carbs                With My Fitness Pal-->When you set-up the jeff or need to adjust settings:                Goals should include:                 Lose 1.5-2 lbs per week                Activity level: not very active (can't count exercise towards calorie number per day)                   ** Daily INPUT> Look at nutrition section-- \"nutrients\" and it will break down your macros for the day (ie. Protein, carbs, fibers, sugars and fats). Try to stay within these numbers daily     2. \"7 minute workout\" to help with  exercise/activity which takes 7 minutes of your day and that you can do at home!   3. \"Calm\" or \"Headspace\" which helps with mindfulness, meditation, clarity, sleep, and efra to your daily life.   4. Skinnytaste blog for healthy recipe ideas  5. DietOpenDrive for low carb resources     HIGH PROTEIN SNACK IDEAS  -cottage cheese  -plain yogurt  -kefir  -hard-boiled eggs  -natural cheeses  -nuts (measure portion size)   -unsweetened nut butters  -dried edamame   -roque seeds soaked in water or almond milk  -soy nuts  -cured meats (monitor for sodium issues)   -hummus with vegetables  -bean dip with vegetables     FRUIT  Low carb fruit options   Raspberries: Half a cup (60 grams) contains 3 grams of carbs.  Blackberries: Half a cup (70 grams) contains 4 grams of carbs.  Strawberries: Half a cup (100 grams) contains 6 grams of carbs.  Blueberries: Half a cup (50 grams) contains 6 grams of carbs.  Plum: One medium-sized (80 grams) contains 6 grams of carbs.     VEGETABLES  Low carb vegetables           Understanding Carbohydrates  Goal <100g  A car needs the right type of fuel to run. And you need the right kind of food to function. To keep your energy level up, your body needs food that has carbohydrates (carbs). But carbs raise blood sugar levels higher and faster than other kinds of food. Your dietitian will work with you to figure out the amount of carbs youneed. Carbs come in 3 types: starches, sugars, and fiber.   Starches  Starches are found in grains, some vegetables, and beans. Grain products include bread, pasta, cereal, and tortillas. Starchy vegetables include potatoes, peas, corn, lima beans, yams, and squash. Kidney beans, domínguez beans,black beans, garbanzo beans, and lentils also have starches.     Sugars  Sugars are found naturally in many foods. Or they can be added. Foods that contain natural sugar include fruits and fruit juices, dairy products, honey, and molasses. Added sugars are found in most  desserts, processed foods, candy, regular soda, and fruit drinks. These are very helpful to treat low blood sugar (hypoglycemia). They give you sugar quickly. Try to keep at least 15 to 20 grams of these simple sugars with you at all times. Eat or drink these if you start to havesymptoms of low blood sugar.   Fiber  Fiber comes from plant foods. Your body can't digest most fiber. Instead of raising blood sugar levels like other carbs, fiber stops blood sugar from rising too fast. Fiber is found in fruits, vegetables, whole grains, beans,peas, and many nuts.   Carb counting  Keep track of the amount of carbs you eat. This can help you keep the right balance of carbs, physical activity, and medicine. The amount of carbs you need will be different from what other people need. How much you need depends on many things. These include your health, the medicines you take, and how active you are. Your healthcare team will help you figure out the right amount of carbs for you. You may start with 45 to 60 grams of carbs per meal, depending on your case. Carb counting is a system that helps you keep track of thecarbohydrates you eat at each meal.   Carbs come from many foods. These include grains, starchy vegetables, fruit, milk, beans, and snack foods. You can either count carbohydrate grams or carbohydrate servings. When you count carbohydrate servings, 1 carbohydrateserving = 15 grams of carbohydrates.   Here are some examples of foods that have about 15 grams of carbs (1 serving of carbohydrates):   1/2 cup of canned or frozen fruit  A small piece of fresh fruit (4 ounces)  1 slice of bread  1/2 cup of oatmeal  1/3 cup of rice  4 to 6 crackers  1/2 English muffin  1/2 cup of black beans  1/4 of a large baked potato (3 ounces)  2/3 cup of plain fat-free yogurt  1 cup of soup  1/2 cup of casserole  6 chicken nuggets  2-inch-square brownie or cake without frosting  2 small cookies  1/2 cup of ice cream or sherbet  Carb  counting is easier when food labels are available. Look at the label to see how many grams of total carbs per serving the food contains. Then you can figure out how much you should eat. If your food doesn't have a nutrition label, you should be able to get an idea how many carbs there are per servingby using a book or website.   Two very important lines to look at on the label are the serving size and the total carbohydrate amount per serving. Here are some tips for using food labelsto count your carbs:   Check the serving size. The information on the label is based on that serving size. If you eat more than the listed serving size, you may have to double or triple the other information on the label.   Check the total grams of carbs.  Total carbohydrate from the label includes sugar, starch, and fiber. Be sure to use the total carbohydrate number (minus the fiber) and not sugar alone.  Know how many grams of carbs you can have.  Be familiar with the matching portion sizes.  Compare labels. Compare the labels of different products. Look at serving sizes and total carbs to find the products that work best for you.   Don't forget protein and fat. With the focus on carb counting, it might be easy to forget protein and fat in your meals. Don't forget to include sources of protein and healthy fat to balance your meals. Also watch how much salt (sodium) you eat. This is especially true if you have high blood pressure. If you have diabetes, limit the amount of sodium to less than 2,300 mg a day.    It’s also important to be consistent with the amount of carbs and time you eat when taking a fixed dose of diabetes medicine. Work with your healthcare provider or dietitian if you need more help. They can help you keep track of your carbs. They can also help you figure out how many grams of carbs youshould have.

## 2025-07-16 ENCOUNTER — TELEMEDICINE (OUTPATIENT)
Dept: SURGERY | Facility: CLINIC | Age: 55
End: 2025-07-16
Payer: COMMERCIAL

## 2025-07-16 DIAGNOSIS — I10 PRIMARY HYPERTENSION: Primary | ICD-10-CM

## 2025-07-16 DIAGNOSIS — K22.719 BARRETT'S ESOPHAGUS WITH DYSPLASIA: ICD-10-CM

## 2025-07-16 DIAGNOSIS — E66.812 OBESITY, CLASS II, BMI 35-39.9: ICD-10-CM

## 2025-07-16 DIAGNOSIS — R06.83 SNORING: ICD-10-CM

## 2025-07-16 DIAGNOSIS — Z82.49 FAMILY HISTORY OF PREMATURE CAD: ICD-10-CM

## 2025-07-16 DIAGNOSIS — K21.00 GASTROESOPHAGEAL REFLUX DISEASE WITH ESOPHAGITIS WITHOUT HEMORRHAGE: ICD-10-CM

## 2025-07-16 DIAGNOSIS — E78.00 HYPERCHOLESTEROLEMIA: ICD-10-CM

## 2025-07-16 NOTE — PROGRESS NOTES
Patient ID: Lj De La Rosa is a 54 year old male.  No chief complaint on file.         HISTORY OF PRESENT ILLNESS:   Patient presents for above.  This visit is conducted using Telemedicine with live, interactive video and audio.    Zepbound cost around 1000$, he did not pick it up  He believes he losta  few lbs  Was on vacation since his last visit    Review of Systems   MEDICAL HISTORY:   Past Medical History[1]    Past Surgical History[2]    Medications - Current[3]    Allergies:Allergies[4]      Social History     Socioeconomic History    Marital status:      Spouse name: Not on file    Number of children: Not on file    Years of education: Not on file    Highest education level: Not on file   Occupational History    Occupation:    Tobacco Use    Smoking status: Former     Current packs/day: 0.00     Average packs/day: 2.0 packs/day for 14.0 years (28.0 ttl pk-yrs)     Types: Cigarettes     Start date: 1996     Quit date: 2010     Years since quittin.8    Smokeless tobacco: Never   Vaping Use    Vaping status: Some Days    Substances: THC   Substance and Sexual Activity    Alcohol use: Yes     Comment: 6 beers daily    Drug use: Not Currently     Types: Cannabis     Comment: daily    Sexual activity: Yes     Partners: Female   Other Topics Concern     Service Not Asked    Blood Transfusions Not Asked    Caffeine Concern Yes     Comment: Coffee 1 cup daily    Occupational Exposure Not Asked    Hobby Hazards Not Asked    Sleep Concern Not Asked    Stress Concern Not Asked    Weight Concern Not Asked    Special Diet Not Asked    Back Care Not Asked    Exercise Not Asked    Bike Helmet Not Asked    Seat Belt Not Asked    Self-Exams Not Asked    Grew up on a farm Not Asked    History of tanning Not Asked    Outdoor occupation Not Asked    Pt has a pacemaker No    Pt has a defibrillator No    Reaction to local anesthetic No   Social History Narrative    Not on file     Social  Drivers of Health     Food Insecurity: Not on file   Transportation Needs: Not on file   Stress: Not on file   Housing Stability: Not on file       PHYSICAL EXAM:   Unable to perform vitals or do physical exam as this is a virtual video visit.  Patient appears alert.  No conversational dyspnea or distress.    ASSESSMENT/PLAN:   1. Primary hypertension  2. Hypercholesterolemia  3. Family history of premature CAD  4. Obesity, Class II, BMI 35-39.9  5. Gastroesophageal reflux disease with esophagitis without hemorrhage  6. Maharaj's esophagus with dysplasia     - Initial weight 260 lb (117.9 kg), Initial Body mass index is 38.96 kg/m².  -BP rechecked in the office, discussed importance of taking BP medications, declined any cardiac or respiratory symptoms today. Discussed to go to the ER if she has any red flag symptoms. Instructed to follow up with PCP within 1 week  - HTN on amlodipine, recent episode in 12/2024 cardiac workup negative  - GERD/ maharaj's esophagus stable, on PPI, scheduled for a follow up in 12/2025  - sleep study years ago, BRAYAN?. Used mouth piece for while. Just using 2 pillows and he thinks it helps, he declined a sleep study today  - patient is interested in GLP1 medications, patient denies any personal or family history of MTC or in patients with Multiple Endocrine Neoplasia syndrome type 2 (MEN 2). Counseled patient regarding the potential risk for MTC with the use of semaglutide and inform them of symptoms of thyroid tumors (eg, a mass in the neck, dysphagia, dyspnea, persistent hoarseness).      Plan:  - labs  - start wegovy    No follow-ups on file.    Time spent on encounter  30 minutes   Video time 15 minutes   Documentation time 15 minutes     Lj De La Rosa understands video evaluation is not a substitute for face-to-face examination or emergency care. Patient advised to go to ER or call 911 for worsening symptoms or acute distress.     Telehealth outside of [facility]  Telehealth Verbal  Consent   I conducted a telehealth visit with Lj De La Rosa today, 07/16/25, which was completed using two-way, real-time interactive audio and video communication. This has been done in good aury to provide continuity of care in the best interest of the provider-patient relationship, due to the COVID -19 public health crisis/national emergency where restrictions of face-to-face office visits are ongoing. Every conscious effort was taken to allow for sufficient and adequate time to complete the visit.  The patient was made aware of the limitations of the telehealth visit, including treatment limitations as no physical exam could be performed.  The patient was advised to call 911 or to go to the ER in case there was an emergency.  The patient was also advised of the potential privacy & security concerns related to the telehealth platform.   The patient was made aware of where to find [facility]'s notice of privacy practices, telehealth consent form and other related consent forms and documents.  which are located on the [facility] website. The patient verbally agreed to telehealth consent form, related consents and the risks discussed.    Lastly, the patient confirmed that they were in [place].   Included in this visit, time may have been spent reviewing labs, medications, radiology tests and decision making. Appropriate medical decision-making and tests are ordered as detailed in the plan of care above.  Coding/billing information is submitted for this visit based on complexity of care and/or time spent for the visit.    This note was prepared using Dragon Medical voice recognition dictation software. As a result errors may occur. When identified these errors have been corrected. While every attempt is made to correct errors during dictation discrepancies may still exist.    Kandace Valdes MD  7/16/2025       [1]   Past Medical History:   Crain's esophagus    GERD (gastroesophageal reflux disease)    Hypercholesterolemia     Primary hypertension   [2]   Past Surgical History:  Procedure Laterality Date    Carpal tunnel release Bilateral 1998    Colonoscopy N/A 8/4/2020    Procedure: COLONOSCOPY;  Surgeon: Nael Andrade MD;  Location: [facility] ENDOSCOPY    Elbow surgery Right 2015    Ulnar nerve    Other Right 1990    Shoulder surgery    Tonsillectomy  Age 7   [3]   Current Outpatient Medications:     Tirzepatide-Weight Management (ZEPBOUND) 2.5 MG/0.5ML Subcutaneous Solution Auto-injector, Inject 2.5 mg into the skin once a week for 4 doses., Disp: 2 mL, Rfl: 0    amLODIPine 5 MG Oral Tab, Take 1 tablet (5 mg total) by mouth daily., Disp: , Rfl:     OMEPRAZOLE 40 MG Oral Capsule Delayed Release, TAKE 1 CAPSULE(40 MG) BY MOUTH DAILY, Disp: 90 capsule, Rfl: 3  [4] No Known Allergies

## 2025-08-26 ENCOUNTER — MED REC SCAN ONLY (OUTPATIENT)
Dept: INTERNAL MEDICINE CLINIC | Facility: CLINIC | Age: 55
End: 2025-08-26

## (undated) DEVICE — ENDOSCOPY PACK UPPER: Brand: MEDLINE INDUSTRIES, INC.

## (undated) DEVICE — KIT CLEAN ENDOKIT 1.1OZ GOWNX2

## (undated) DEVICE — MEDI-VAC NON-CONDUCTIVE SUCTION TUBING 6MM X 1.8M (6FT.) L: Brand: CARDINAL HEALTH

## (undated) DEVICE — 60 ML SYRINGE REGULAR TIP: Brand: MONOJECT

## (undated) DEVICE — KIT ENDO ORCAPOD 160/180/190

## (undated) DEVICE — CONMED SCOPE SAVER BITE BLOCK, 20X27 MM: Brand: SCOPE SAVER

## (undated) DEVICE — FORCEP RADIAL JAW 4

## (undated) DEVICE — Device: Brand: DEFENDO AIR/WATER/SUCTION AND BIOPSY VALVE

## (undated) DEVICE — MEDI-VAC NON-CONDUCTIVE SUCTION TUBING: Brand: CARDINAL HEALTH

## (undated) DEVICE — 35 ML SYRINGE REGULAR TIP: Brand: MONOJECT

## (undated) DEVICE — LINE MNTR ADLT SET O2 INTMD

## (undated) DEVICE — FORCEPS BX L240CM DIA2.4MM L NDL RAD JAW 4

## (undated) DEVICE — YANKAUER SUCTION INSTRUMENT NO CONTROL VENT, BULB TIP, CLEAR: Brand: YANKAUER

## (undated) DEVICE — Device: Brand: DUAL NARE NASAL CANNULAE FEMALE LUER CON 7FT O2 TUBE

## (undated) DEVICE — Device: Brand: CUSTOM PROCEDURE KIT

## (undated) NOTE — ED AVS SNAPSHOT
Harl Cowden   MRN: [id number]    Department:  [facility] Emergency Department   Date of Visit:  7/17/2019           Disclosure     Insurance plans vary and the physician(s) referred by the ER may not be covered by your plan.  Please contact CARE PHYSICIAN AT ONCE OR RETURN IMMEDIATELY TO THE EMERGENCY DEPARTMENT. If you have been prescribed any medication(s), please fill your prescription right away and begin taking the medication(s) as directed.   If you believe that any of the medications

## (undated) NOTE — LETTER
[address] [place] [address]    Consent for Diagnostic Services    Your physician has ordered one of the following tests to determine the function of your heart: Regadenoson Sestamibi.  The information obtained will aid your physician in d isotope is injected during the infusion.  After either pharmacological stress test, you will have either a nuclear scan or an echocardiogram.    The information that is obtained during your testing will be treated as privileged and confidential. The informa

## (undated) NOTE — LETTER
[address] [facility] [address] [place], [place]  Authorization for Surgical Operation and Procedure                                                                                           I hereby authorize Ernesto Mcnally MD, my physician and his/her assistants (if applicable), which may include medical students, residents, and/or fellows, to perform the following surgical operation/ procedure and administer such anesthesia as may be determined necessary by my physician: Operation/Procedure name (s) ESOPHAGOGASTRODUODENOSCOPY on Kandi Boyd   2. I recognize that during the surgical operation/procedure, unforeseen conditions may necessitate additional or different procedures than those listed above. I, therefore, further authorize and request that the above-named surgeon, assistants, or designees perform such procedures as are, in their judgment, necessary and desirable. 3.   My surgeon/physician has discussed prior to my surgery the potential benefits, risks and side effects of this procedure; the likelihood of achieving goals; and potential problems that might occur during recuperation. They also discussed reasonable alternatives to the procedure, including risks, benefits, and side effects related to the alternatives and risks related to not receiving this procedure. I have had all my questions answered and I acknowledge that no guarantee has been made as to the result that may be obtained. 4.   Should the need arise during my operation/procedure, which includes change of level of care prior to discharge, I also consent to the administration of blood and/or blood products. Further, I understand that despite careful testing and screening of blood or blood products by collecting agencies, I may still be subject to ill effects as a result of receiving a blood transfusion and/or blood products.   The following are some, but not all, of the potential risks that can occur: fever and allergic reactions, hemolytic reactions, transmission of diseases such as Hepatitis, AIDS and Cytomegalovirus (CMV) and fluid overload. In the event that I wish to have an autologous transfusion of my own blood, or a directed donor transfusion, I will discuss this with my physician. Check only if Refusing Blood or Blood Products  I understand refusal of blood or blood products as deemed necessary by my physician may have serious consequences to my condition to include possible death. I hereby assume responsibility for my refusal and release the hospital, its personnel, and my physicians from any responsibility for the consequences of my refusal.    o  Refuse   5. I authorize the use of any specimen, organs, tissues, body parts or foreign objects that may be removed from my body during the operation/procedure for diagnosis, research or teaching purposes and their subsequent disposal by hospital authorities. I also authorize the release of specimen test results and/or written reports to my treating physician on the hospital medical staff or other referring or consulting physicians involved in my care, at the discretion of the Pathologist or my treating physician. 6.   I consent to the photographing or videotaping of the operations or procedures to be performed, including appropriate portions of my body for medical, scientific, or educational purposes, provided my identity is not revealed by the pictures or by descriptive texts accompanying them. If the procedure has been photographed/videotaped, the surgeon will obtain the original picture, image, videotape or CD. The hospital will not be responsible for storage, release or maintenance of the picture, image, tape or CD.    7.   I consent to the presence of a  or observers in the operating room as deemed necessary by my physician or their designees.     8.   I recognize that in the event my procedure results in extended X-Ray/fluoroscopy time, I may develop a skin reaction. 9. If I have a Do Not Attempt Resuscitation (DNAR) order in place, that status will be suspended while in the operating room, procedural suite, and during the recovery period unless otherwise explicitly stated by me (or a person authorized to consent on my behalf). The surgeon or my attending physician will determine when the applicable recovery period ends for purposes of reinstating the DNAR order. 10. Patients having a sterilization procedure: I understand that if the procedure is successful the results will be permanent and it will therefore be impossible for me to inseminate, conceive, or bear children. I also understand that the procedure is intended to result in sterility, although the result has not been guaranteed. 11. I acknowledge that my physician has explained sedation/analgesia administration to me including the risk and benefits I consent to the administration of sedation/analgesia as may be necessary or desirable in the judgment of my physician. I CERTIFY THAT I HAVE READ AND FULLY UNDERSTAND THE ABOVE CONSENT TO OPERATION and/or OTHER PROCEDURE.     _________________________________________ _________________________________     ___________________________________  Signature of Patient     Signature of Responsible Person                   Printed Name of Responsible Person                              _________________________________________ ______________________________        ___________________________________  Signature of Witness         Date  Time         Relationship to Patient    STATEMENT OF PHYSICIAN My signature below affirms that prior to the time of the procedure; I have explained to the patient and/or his/her legal representative, the risks and benefits involved in the proposed treatment and any reasonable alternative to the proposed treatment.  I have also explained the risks and benefits involved in refusal of the proposed treatment and alternatives to the proposed treatment and have answered the patient's questions.  If I have a significant financial interest in a co-management agreement or a significant financial interest in any product or implant, or other significant relationship used in this procedure/surgery, I have disclosed this and had a discussion with my patient.     _______________________________________________________________ _____________________________  Suellen Gomez Physician)                                                                                         (Date)                                   (Time)  Patient Name: Jorge Leal    : 1970   Printed: 10/9/2023      Medical Record #: [id number]                                              Page 1 of 1

## (undated) NOTE — IP AVS SNAPSHOT
[facility]    [address], [place], [place] ~ [phone]                Discharge Summary   6/16/2017    Trenton Moreno           Admission Information        Provider Department    6/16/2017 Pedro Fleming 55  Joey Kimble MD [employer] can help with your Affordable Care Act coverage, as well as all types of Medicaid plans. To get signed up and covered, please call [phone] and ask to get set up for an insurance coverage that is in-network with [facility].         Visit

## (undated) NOTE — LETTER
9/3/2020              Amelia Chong        [address]         Dear Ara Sutherland,    I wanted to get back to you with your colonoscopy and EGD results. You had no colon polyps. Please repeat colonoscopy in 10 years time.  Random sa

## (undated) NOTE — LETTER
[address] [facility]    Consent for Coronary CT Angiography    Your doctor has recommended that you have a Coronary CT Angiography procedure.  Coronary CT Angiography is a diagnostic procedure using computed tomography to scan the patient when taking medication. Allergic reactions to medication can range from very minor to very serious leading to a life threatening situation or even death. Please be sure to communicate any allergy you may have to your caregiver immediately.     The i

## (undated) NOTE — LETTER
Date & Time: 3/17/2020, 1:27 PM  Patient: Carlos Mukherjee  Encounter Provider(s):    ESTEBAN Delong       To Whom It May Concern:    Jake Taisha was seen and treated in our department on 3/17/2020.  He may return to work 3/19/2020    If you hav

## (undated) NOTE — MR AVS SNAPSHOT
[facility]  [address] [place] [id number] [phone]               Thank you for choosing us for your health care visit with Bonifacio Patton. Sol Barron MD.  We are glad to serve you and happy to provide you with this summary of your visit. [email or web address]. If you've recently had a stay at the Hospital you can access your discharge instructions in [email or web address] by going to Visits < Admission Summaries.  If you've been to the Emergency Department or your doctor's office, you can view yo Visit [facility] online at  [email or web address]